# Patient Record
Sex: FEMALE | Race: WHITE | Employment: UNEMPLOYED | URBAN - METROPOLITAN AREA
[De-identification: names, ages, dates, MRNs, and addresses within clinical notes are randomized per-mention and may not be internally consistent; named-entity substitution may affect disease eponyms.]

---

## 2018-04-19 ENCOUNTER — OFFICE VISIT (OUTPATIENT)
Dept: FAMILY MEDICINE CLINIC | Age: 58
End: 2018-04-19
Payer: MEDICARE

## 2018-04-19 VITALS
HEART RATE: 63 BPM | BODY MASS INDEX: 28.7 KG/M2 | WEIGHT: 162 LBS | DIASTOLIC BLOOD PRESSURE: 78 MMHG | RESPIRATION RATE: 16 BRPM | TEMPERATURE: 98.3 F | SYSTOLIC BLOOD PRESSURE: 118 MMHG | HEIGHT: 63 IN | OXYGEN SATURATION: 93 %

## 2018-04-19 DIAGNOSIS — K29.70 GASTRITIS WITHOUT BLEEDING, UNSPECIFIED CHRONICITY, UNSPECIFIED GASTRITIS TYPE: ICD-10-CM

## 2018-04-19 DIAGNOSIS — I10 ESSENTIAL HYPERTENSION: Primary | ICD-10-CM

## 2018-04-19 DIAGNOSIS — K11.20 SIALADENITIS: ICD-10-CM

## 2018-04-19 DIAGNOSIS — R79.89 ABNORMAL CBC: ICD-10-CM

## 2018-04-19 DIAGNOSIS — E78.00 HYPERCHOLESTEROLEMIA: ICD-10-CM

## 2018-04-19 DIAGNOSIS — Z13.29 SCREENING FOR THYROID DISORDER: ICD-10-CM

## 2018-04-19 PROCEDURE — 4004F PT TOBACCO SCREEN RCVD TLK: CPT | Performed by: INTERNAL MEDICINE

## 2018-04-19 PROCEDURE — 3017F COLORECTAL CA SCREEN DOC REV: CPT | Performed by: INTERNAL MEDICINE

## 2018-04-19 PROCEDURE — G8419 CALC BMI OUT NRM PARAM NOF/U: HCPCS | Performed by: INTERNAL MEDICINE

## 2018-04-19 PROCEDURE — G8427 DOCREV CUR MEDS BY ELIG CLIN: HCPCS | Performed by: INTERNAL MEDICINE

## 2018-04-19 PROCEDURE — 99203 OFFICE O/P NEW LOW 30 MIN: CPT | Performed by: INTERNAL MEDICINE

## 2018-04-19 RX ORDER — AMOXICILLIN AND CLAVULANATE POTASSIUM 875; 125 MG/1; MG/1
1 TABLET, FILM COATED ORAL 2 TIMES DAILY
Qty: 14 TABLET | Refills: 0 | Status: SHIPPED | OUTPATIENT
Start: 2018-04-19 | End: 2018-04-26

## 2018-04-19 RX ORDER — DOXEPIN HYDROCHLORIDE 100 MG/1
100 CAPSULE ORAL NIGHTLY
COMMUNITY
Start: 2018-03-22 | End: 2018-05-22 | Stop reason: SDUPTHER

## 2018-04-19 RX ORDER — ATENOLOL 25 MG/1
25 TABLET ORAL DAILY
COMMUNITY
Start: 2018-04-18 | End: 2018-05-22 | Stop reason: SDUPTHER

## 2018-04-19 RX ORDER — TIZANIDINE 4 MG/1
4 TABLET ORAL NIGHTLY
COMMUNITY
End: 2022-05-16 | Stop reason: ALTCHOICE

## 2018-04-19 ASSESSMENT — PATIENT HEALTH QUESTIONNAIRE - PHQ9
1. LITTLE INTEREST OR PLEASURE IN DOING THINGS: 0
2. FEELING DOWN, DEPRESSED OR HOPELESS: 0
SUM OF ALL RESPONSES TO PHQ QUESTIONS 1-9: 0
SUM OF ALL RESPONSES TO PHQ9 QUESTIONS 1 & 2: 0

## 2018-04-25 ENCOUNTER — HOSPITAL ENCOUNTER (OUTPATIENT)
Age: 58
Setting detail: SPECIMEN
Discharge: HOME | End: 2018-04-25
Payer: MEDICARE

## 2018-04-25 DIAGNOSIS — I10 ESSENTIAL HYPERTENSION: ICD-10-CM

## 2018-04-25 DIAGNOSIS — E78.00 HYPERCHOLESTEROLEMIA: ICD-10-CM

## 2018-04-25 DIAGNOSIS — R79.89 ABNORMAL CBC: ICD-10-CM

## 2018-04-25 DIAGNOSIS — Z13.29 SCREENING FOR THYROID DISORDER: ICD-10-CM

## 2018-04-25 LAB
ABSOLUTE EOS #: 0.09 K/UL (ref 0–0.44)
ABSOLUTE IMMATURE GRANULOCYTE: 0.05 K/UL (ref 0–0.3)
ABSOLUTE LYMPH #: 1.99 K/UL (ref 1.1–3.7)
ABSOLUTE MONO #: 0.6 K/UL (ref 0.1–1.2)
ALBUMIN SERPL-MCNC: 4.3 G/DL (ref 3.5–5.2)
ALBUMIN/GLOBULIN RATIO: 1.9 (ref 1–2.5)
ALP BLD-CCNC: 119 U/L (ref 35–104)
ALT SERPL-CCNC: 33 U/L (ref 5–33)
ANION GAP SERPL CALCULATED.3IONS-SCNC: 14 MMOL/L (ref 9–17)
AST SERPL-CCNC: 16 U/L
BASOPHILS # BLD: 0 % (ref 0–2)
BASOPHILS ABSOLUTE: 0.04 K/UL (ref 0–0.2)
BILIRUB SERPL-MCNC: 0.3 MG/DL (ref 0.3–1.2)
BUN BLDV-MCNC: 9 MG/DL (ref 6–20)
BUN/CREAT BLD: ABNORMAL (ref 9–20)
CALCIUM SERPL-MCNC: 9 MG/DL (ref 8.6–10.4)
CHLORIDE BLD-SCNC: 103 MMOL/L (ref 98–107)
CHOLESTEROL, FASTING: 181 MG/DL
CHOLESTEROL/HDL RATIO: 3.5
CO2: 28 MMOL/L (ref 20–31)
CREAT SERPL-MCNC: 0.65 MG/DL (ref 0.5–0.9)
DIFFERENTIAL TYPE: ABNORMAL
EOSINOPHILS RELATIVE PERCENT: 1 % (ref 1–4)
GFR AFRICAN AMERICAN: >60 ML/MIN
GFR NON-AFRICAN AMERICAN: >60 ML/MIN
GFR SERPL CREATININE-BSD FRML MDRD: ABNORMAL ML/MIN/{1.73_M2}
GFR SERPL CREATININE-BSD FRML MDRD: ABNORMAL ML/MIN/{1.73_M2}
GLUCOSE BLD-MCNC: 100 MG/DL (ref 70–99)
HCT VFR BLD CALC: 46.9 % (ref 36.3–47.1)
HDLC SERPL-MCNC: 52 MG/DL
HEMOGLOBIN: 14.7 G/DL (ref 11.9–15.1)
IMMATURE GRANULOCYTES: 1 %
LDL CHOLESTEROL: 103 MG/DL (ref 0–130)
LYMPHOCYTES # BLD: 21 % (ref 24–43)
MCH RBC QN AUTO: 28.9 PG (ref 25.2–33.5)
MCHC RBC AUTO-ENTMCNC: 31.3 G/DL (ref 28.4–34.8)
MCV RBC AUTO: 92.1 FL (ref 82.6–102.9)
MONOCYTES # BLD: 6 % (ref 3–12)
NRBC AUTOMATED: 0 PER 100 WBC
PDW BLD-RTO: 13.2 % (ref 11.8–14.4)
PLATELET # BLD: 210 K/UL (ref 138–453)
PLATELET ESTIMATE: ABNORMAL
PMV BLD AUTO: 11.5 FL (ref 8.1–13.5)
POTASSIUM SERPL-SCNC: 4.2 MMOL/L (ref 3.7–5.3)
RBC # BLD: 5.09 M/UL (ref 3.95–5.11)
RBC # BLD: ABNORMAL 10*6/UL
SEG NEUTROPHILS: 71 % (ref 36–65)
SEGMENTED NEUTROPHILS ABSOLUTE COUNT: 6.69 K/UL (ref 1.5–8.1)
SODIUM BLD-SCNC: 145 MMOL/L (ref 135–144)
TOTAL PROTEIN: 6.6 G/DL (ref 6.4–8.3)
TRIGLYCERIDE, FASTING: 128 MG/DL
TSH SERPL DL<=0.05 MIU/L-ACNC: 4.93 MIU/L (ref 0.3–5)
VLDLC SERPL CALC-MCNC: NORMAL MG/DL (ref 1–30)
WBC # BLD: 9.5 K/UL (ref 3.5–11.3)
WBC # BLD: ABNORMAL 10*3/UL

## 2018-05-22 ENCOUNTER — OFFICE VISIT (OUTPATIENT)
Dept: FAMILY MEDICINE CLINIC | Age: 58
End: 2018-05-22
Payer: MEDICARE

## 2018-05-22 VITALS
SYSTOLIC BLOOD PRESSURE: 138 MMHG | HEIGHT: 63 IN | DIASTOLIC BLOOD PRESSURE: 94 MMHG | TEMPERATURE: 97 F | BODY MASS INDEX: 28.53 KG/M2 | WEIGHT: 161 LBS | HEART RATE: 73 BPM | OXYGEN SATURATION: 93 %

## 2018-05-22 DIAGNOSIS — F41.1 GAD (GENERALIZED ANXIETY DISORDER): ICD-10-CM

## 2018-05-22 DIAGNOSIS — G47.9 SLEEPING DIFFICULTY: ICD-10-CM

## 2018-05-22 DIAGNOSIS — M26.629 TEMPOROMANDIBULAR JOINT-PAIN-DYSFUNCTION SYNDROME (TMJ): ICD-10-CM

## 2018-05-22 DIAGNOSIS — I10 ESSENTIAL HYPERTENSION: Primary | ICD-10-CM

## 2018-05-22 DIAGNOSIS — F33.41 RECURRENT MAJOR DEPRESSIVE DISORDER, IN PARTIAL REMISSION (HCC): ICD-10-CM

## 2018-05-22 DIAGNOSIS — E78.00 HYPERCHOLESTEROLEMIA: ICD-10-CM

## 2018-05-22 DIAGNOSIS — Z72.0 TOBACCO ABUSE: ICD-10-CM

## 2018-05-22 DIAGNOSIS — Q79.60 EHLERS-DANLOS SYNDROME: ICD-10-CM

## 2018-05-22 PROCEDURE — 3017F COLORECTAL CA SCREEN DOC REV: CPT | Performed by: INTERNAL MEDICINE

## 2018-05-22 PROCEDURE — 99214 OFFICE O/P EST MOD 30 MIN: CPT | Performed by: INTERNAL MEDICINE

## 2018-05-22 PROCEDURE — G8419 CALC BMI OUT NRM PARAM NOF/U: HCPCS | Performed by: INTERNAL MEDICINE

## 2018-05-22 PROCEDURE — G8427 DOCREV CUR MEDS BY ELIG CLIN: HCPCS | Performed by: INTERNAL MEDICINE

## 2018-05-22 PROCEDURE — 4004F PT TOBACCO SCREEN RCVD TLK: CPT | Performed by: INTERNAL MEDICINE

## 2018-05-22 RX ORDER — ATENOLOL 25 MG/1
25 TABLET ORAL DAILY
Qty: 90 TABLET | Refills: 1 | Status: SHIPPED | OUTPATIENT
Start: 2018-05-22 | End: 2018-11-12 | Stop reason: SDUPTHER

## 2018-05-22 RX ORDER — SIMVASTATIN 40 MG
40 TABLET ORAL DAILY
Qty: 90 TABLET | Refills: 1 | Status: SHIPPED | OUTPATIENT
Start: 2018-05-22 | End: 2018-11-12 | Stop reason: SDUPTHER

## 2018-05-22 RX ORDER — DOXEPIN HYDROCHLORIDE 100 MG/1
100 CAPSULE ORAL NIGHTLY
Qty: 90 CAPSULE | Refills: 1 | Status: SHIPPED | OUTPATIENT
Start: 2018-05-22 | End: 2018-11-12 | Stop reason: SDUPTHER

## 2018-05-22 RX ORDER — OMEPRAZOLE 20 MG/1
20 CAPSULE, DELAYED RELEASE ORAL DAILY
COMMUNITY
End: 2018-11-16 | Stop reason: SDUPTHER

## 2018-06-05 DIAGNOSIS — Z12.31 ENCOUNTER FOR SCREENING MAMMOGRAM FOR BREAST CANCER: Primary | ICD-10-CM

## 2018-08-13 NOTE — TELEPHONE ENCOUNTER
Pt LM on nurses line asking for refill. Last filled by previous PCP.   Pt states she will be out on Thursday 8/16/18    Next Visit Date:  Future Appointments  Date Time Provider Willis Barrigai   8/31/2018 1:15 PM Tiffanie Sy  [address] [facility]   Topic Date Due    Colon cancer screen colonoscopy  03/24/2010    Low dose CT lung screening  03/24/2015    DTaP/Tdap/Td vaccine (1 - Tdap) 04/19/2019 (Originally 3/24/1979)    Breast cancer screen  04/19/2019 (Originally 3/24/2010)    Cervical cancer screen  04/19/2019 (Originally 3/24/1981)    Pneumococcal med risk (1 of 1 - PPSV23) 04/19/2019 (Originally 3/24/1979)    Shingles Vaccine (1 of 2 - 2 Dose Series) 04/19/2019 (Originally 3/24/2010)    Hepatitis C screen  04/19/2019 (Originally 1960)    HIV screen  04/19/2019 (Originally 3/24/1975)    Flu vaccine (1) 09/01/2018    Potassium monitoring  04/25/2019    Creatinine monitoring  04/25/2019    Lipid screen  04/25/2023       No results found for: LABA1C          ( goal A1C is < 7)   No results found for: LABMICR  LDL Cholesterol (mg/dL)   Date Value   04/25/2018 103   09/18/2014 191 (H)       (goal LDL is <100)   AST (U/L)   Date Value   04/25/2018 16     ALT (U/L)   Date Value   04/25/2018 33     BUN (mg/dL)   Date Value   04/25/2018 9     BP Readings from Last 3 Encounters:   05/22/18 (!) 138/94   04/19/18 118/78   02/23/15 133/73          (goal 120/80)    All Future Testing planned in [facility]  Lab Frequency Next Occurrence   JUAN JOSÉ DIGITAL DIAGNOSTIC W OR WO CAD BILATERAL Once 12/05/2018               Patient Active Problem List:     Recurrent major depressive disorder, in partial remission ([facility] [address]     Essential hypertension     Hypercholesterolemia     ELY (generalized anxiety disorder)     Jordan-Danlos syndrome     Temporomandibular joint-pain-dysfunction syndrome (TMJ)

## 2018-08-14 RX ORDER — HYDROXYZINE PAMOATE 50 MG/1
50 CAPSULE ORAL 2 TIMES DAILY
Qty: 60 CAPSULE | Refills: 1 | Status: SHIPPED | OUTPATIENT
Start: 2018-08-14 | End: 2018-10-12 | Stop reason: SDUPTHER

## 2018-08-31 ENCOUNTER — OFFICE VISIT (OUTPATIENT)
Dept: FAMILY MEDICINE CLINIC | Age: 58
End: 2018-08-31
Payer: MEDICARE

## 2018-08-31 VITALS
SYSTOLIC BLOOD PRESSURE: 130 MMHG | TEMPERATURE: 98.8 F | BODY MASS INDEX: 28.76 KG/M2 | WEIGHT: 159.8 LBS | RESPIRATION RATE: 16 BRPM | HEART RATE: 56 BPM | OXYGEN SATURATION: 95 % | DIASTOLIC BLOOD PRESSURE: 72 MMHG

## 2018-08-31 DIAGNOSIS — E78.00 HYPERCHOLESTEROLEMIA: ICD-10-CM

## 2018-08-31 DIAGNOSIS — F41.1 GAD (GENERALIZED ANXIETY DISORDER): ICD-10-CM

## 2018-08-31 DIAGNOSIS — M15.9 GENERALIZED OSTEOARTHRITIS: ICD-10-CM

## 2018-08-31 DIAGNOSIS — F33.41 RECURRENT MAJOR DEPRESSIVE DISORDER, IN PARTIAL REMISSION (HCC): Primary | ICD-10-CM

## 2018-08-31 DIAGNOSIS — I10 ESSENTIAL HYPERTENSION: ICD-10-CM

## 2018-08-31 DIAGNOSIS — G89.4 CHRONIC PAIN SYNDROME: ICD-10-CM

## 2018-08-31 PROCEDURE — 99214 OFFICE O/P EST MOD 30 MIN: CPT | Performed by: INTERNAL MEDICINE

## 2018-08-31 PROCEDURE — G8419 CALC BMI OUT NRM PARAM NOF/U: HCPCS | Performed by: INTERNAL MEDICINE

## 2018-08-31 PROCEDURE — 3017F COLORECTAL CA SCREEN DOC REV: CPT | Performed by: INTERNAL MEDICINE

## 2018-08-31 PROCEDURE — G8427 DOCREV CUR MEDS BY ELIG CLIN: HCPCS | Performed by: INTERNAL MEDICINE

## 2018-08-31 PROCEDURE — 4004F PT TOBACCO SCREEN RCVD TLK: CPT | Performed by: INTERNAL MEDICINE

## 2018-10-08 NOTE — TELEPHONE ENCOUNTER
Noted as historic med. Pt finished rx from previous PCP today.     Seen 8/31/18  Next Visit Date:  Future Appointments  Date Time Provider Willis Yadira   3/5/2019 1:00 PM Tiffanie Randle  [address] [facility]   Topic Date Due    Colon cancer screen colonoscopy  03/24/2010    Low dose CT lung screening  03/24/2015    Flu vaccine (1) 09/01/2018    DTaP/Tdap/Td vaccine (1 - Tdap) 04/19/2019 (Originally 3/24/1979)    Breast cancer screen  04/19/2019 (Originally 3/24/2010)    Cervical cancer screen  04/19/2019 (Originally 3/24/1981)    Pneumococcal med risk (1 of 1 - PPSV23) 04/19/2019 (Originally 3/24/1979)    Shingles Vaccine (1 of 2 - 2 Dose Series) 04/19/2019 (Originally 3/24/2010)    Hepatitis C screen  04/19/2019 (Originally 1960)    HIV screen  04/19/2019 (Originally 3/24/1975)    Potassium monitoring  04/25/2019    Creatinine monitoring  04/25/2019    Lipid screen  04/25/2023       No results found for: LABA1C          ( goal A1C is < 7)   No results found for: LABMICR  LDL Cholesterol (mg/dL)   Date Value   04/25/2018 103   09/18/2014 191 (H)       (goal LDL is <100)   AST (U/L)   Date Value   04/25/2018 16     ALT (U/L)   Date Value   04/25/2018 33     BUN (mg/dL)   Date Value   04/25/2018 9     BP Readings from Last 3 Encounters:   08/31/18 130/72   05/22/18 (!) 138/94   04/19/18 118/78          (goal 120/80)    All Future Testing planned in [facility]  Lab Frequency Next Occurrence   JUAN JOSÉ DIGITAL DIAGNOSTIC W OR WO CAD BILATERAL Once 12/05/2018               Patient Active Problem List:     Recurrent major depressive disorder, in partial remission ([facility] [address]     Essential hypertension     Hypercholesterolemia     ELY (generalized anxiety disorder)     Jordan-Danlos syndrome     Temporomandibular joint-pain-dysfunction syndrome (TMJ)

## 2018-10-09 RX ORDER — DULOXETIN HYDROCHLORIDE 60 MG/1
60 CAPSULE, DELAYED RELEASE ORAL 2 TIMES DAILY
Qty: 180 CAPSULE | Refills: 1 | Status: SHIPPED | OUTPATIENT
Start: 2018-10-09 | End: 2020-02-10

## 2018-10-12 RX ORDER — HYDROXYZINE PAMOATE 50 MG/1
CAPSULE ORAL
Qty: 60 CAPSULE | Refills: 1 | Status: SHIPPED | OUTPATIENT
Start: 2018-10-12 | End: 2018-12-10 | Stop reason: SDUPTHER

## 2018-11-12 DIAGNOSIS — E78.00 HYPERCHOLESTEROLEMIA: ICD-10-CM

## 2018-11-12 DIAGNOSIS — G47.9 SLEEPING DIFFICULTY: ICD-10-CM

## 2018-11-12 DIAGNOSIS — I10 ESSENTIAL HYPERTENSION: ICD-10-CM

## 2018-11-12 RX ORDER — DOXEPIN HYDROCHLORIDE 100 MG/1
CAPSULE ORAL
Qty: 90 CAPSULE | Refills: 1 | Status: SHIPPED | OUTPATIENT
Start: 2018-11-12 | End: 2019-05-06 | Stop reason: SDUPTHER

## 2018-11-12 RX ORDER — PSYLLIUM HUSK 3.4 G/7G
POWDER ORAL
Qty: 180 TABLET | Refills: 1 | Status: SHIPPED | OUTPATIENT
Start: 2018-11-12 | End: 2019-05-06 | Stop reason: SDUPTHER

## 2018-11-12 RX ORDER — SIMVASTATIN 40 MG
TABLET ORAL
Qty: 90 TABLET | Refills: 1 | Status: SHIPPED | OUTPATIENT
Start: 2018-11-12 | End: 2019-05-06 | Stop reason: SDUPTHER

## 2018-11-12 RX ORDER — ATENOLOL 25 MG/1
TABLET ORAL
Qty: 90 TABLET | Refills: 1 | Status: SHIPPED | OUTPATIENT
Start: 2018-11-12 | End: 2019-05-06 | Stop reason: SDUPTHER

## 2018-11-16 RX ORDER — OMEPRAZOLE 20 MG/1
20 CAPSULE, DELAYED RELEASE ORAL DAILY
Qty: 30 CAPSULE | Refills: 5 | Status: SHIPPED | OUTPATIENT
Start: 2018-11-16 | End: 2020-10-07 | Stop reason: ALTCHOICE

## 2018-11-16 NOTE — TELEPHONE ENCOUNTER
Last visit 8/31/18  Next Visit Date:  Future Appointments  Date Time Provider Willis May   3/5/2019 1:00 PM Tiffanie Kenyon  [address] [facility]   Topic Date Due    Colon cancer screen colonoscopy  03/24/2010    Low dose CT lung screening  03/24/2015    Flu vaccine (1) 09/01/2018    DTaP/Tdap/Td vaccine (1 - Tdap) 04/19/2019 (Originally 3/24/1979)    Breast cancer screen  04/19/2019 (Originally 3/24/2010)    Cervical cancer screen  04/19/2019 (Originally 3/24/1981)    Pneumococcal med risk (1 of 1 - PPSV23) 04/19/2019 (Originally 3/24/1979)    Shingles Vaccine (1 of 2 - 2 Dose Series) 04/19/2019 (Originally 3/24/2010)    Hepatitis C screen  04/19/2019 (Originally 1960)    HIV screen  04/19/2019 (Originally 3/24/1975)    Potassium monitoring  04/25/2019    Creatinine monitoring  04/25/2019    Lipid screen  04/25/2023       No results found for: LABA1C          ( goal A1C is < 7)   No results found for: LABMICR  LDL Cholesterol (mg/dL)   Date Value   04/25/2018 103   09/18/2014 191 (H)       (goal LDL is <100)   AST (U/L)   Date Value   04/25/2018 16     ALT (U/L)   Date Value   04/25/2018 33     BUN (mg/dL)   Date Value   04/25/2018 9     BP Readings from Last 3 Encounters:   08/31/18 130/72   05/22/18 (!) 138/94   04/19/18 118/78          (goal 120/80)    All Future Testing planned in [facility]  Lab Frequency Next Occurrence   JUAN JOSÉ DIGITAL DIAGNOSTIC W OR WO CAD BILATERAL Once 12/05/2018               Patient Active Problem List:     Recurrent major depressive disorder, in partial remission ([facility] [address]     Essential hypertension     Hypercholesterolemia     ELY (generalized anxiety disorder)     Jordan-Danlos syndrome     Temporomandibular joint-pain-dysfunction syndrome (TMJ)

## 2018-12-11 RX ORDER — HYDROXYZINE PAMOATE 50 MG/1
CAPSULE ORAL
Qty: 180 CAPSULE | Refills: 1 | Status: SHIPPED | OUTPATIENT
Start: 2018-12-11 | End: 2019-06-05 | Stop reason: SDUPTHER

## 2019-03-05 ENCOUNTER — OFFICE VISIT (OUTPATIENT)
Dept: FAMILY MEDICINE CLINIC | Age: 59
End: 2019-03-05
Payer: MEDICARE

## 2019-03-05 VITALS
DIASTOLIC BLOOD PRESSURE: 70 MMHG | BODY MASS INDEX: 29.34 KG/M2 | HEART RATE: 62 BPM | WEIGHT: 163 LBS | OXYGEN SATURATION: 96 % | SYSTOLIC BLOOD PRESSURE: 126 MMHG | RESPIRATION RATE: 16 BRPM | TEMPERATURE: 98.5 F

## 2019-03-05 DIAGNOSIS — R52 CHRONIC GENERALIZED PAIN: ICD-10-CM

## 2019-03-05 DIAGNOSIS — F33.41 RECURRENT MAJOR DEPRESSIVE DISORDER, IN PARTIAL REMISSION (HCC): ICD-10-CM

## 2019-03-05 DIAGNOSIS — E78.00 HYPERCHOLESTEROLEMIA: Primary | ICD-10-CM

## 2019-03-05 DIAGNOSIS — I10 ESSENTIAL HYPERTENSION: ICD-10-CM

## 2019-03-05 DIAGNOSIS — M53.3 SACROILIAC PAIN: ICD-10-CM

## 2019-03-05 DIAGNOSIS — Z72.0 CONTINUOUS TOBACCO ABUSE: ICD-10-CM

## 2019-03-05 DIAGNOSIS — Q79.60 EHLERS-DANLOS SYNDROME: ICD-10-CM

## 2019-03-05 DIAGNOSIS — G89.29 CHRONIC GENERALIZED PAIN: ICD-10-CM

## 2019-03-05 DIAGNOSIS — F41.1 GAD (GENERALIZED ANXIETY DISORDER): ICD-10-CM

## 2019-03-05 DIAGNOSIS — M26.629 TEMPOROMANDIBULAR JOINT-PAIN-DYSFUNCTION SYNDROME (TMJ): ICD-10-CM

## 2019-03-05 PROCEDURE — G8484 FLU IMMUNIZE NO ADMIN: HCPCS | Performed by: INTERNAL MEDICINE

## 2019-03-05 PROCEDURE — 3017F COLORECTAL CA SCREEN DOC REV: CPT | Performed by: INTERNAL MEDICINE

## 2019-03-05 PROCEDURE — 99214 OFFICE O/P EST MOD 30 MIN: CPT | Performed by: INTERNAL MEDICINE

## 2019-03-05 PROCEDURE — G8427 DOCREV CUR MEDS BY ELIG CLIN: HCPCS | Performed by: INTERNAL MEDICINE

## 2019-03-05 PROCEDURE — G8419 CALC BMI OUT NRM PARAM NOF/U: HCPCS | Performed by: INTERNAL MEDICINE

## 2019-03-05 PROCEDURE — 4004F PT TOBACCO SCREEN RCVD TLK: CPT | Performed by: INTERNAL MEDICINE

## 2019-03-05 RX ORDER — NAPROXEN 500 MG/1
500 TABLET ORAL 2 TIMES DAILY WITH MEALS
Qty: 60 TABLET | Refills: 5 | Status: SHIPPED | OUTPATIENT
Start: 2019-03-05 | End: 2020-03-06

## 2019-03-05 RX ORDER — METHYLPREDNISOLONE 4 MG/1
TABLET ORAL
Qty: 1 KIT | Refills: 0 | Status: SHIPPED | OUTPATIENT
Start: 2019-03-05 | End: 2019-03-11

## 2019-04-30 ENCOUNTER — OFFICE VISIT (OUTPATIENT)
Dept: FAMILY MEDICINE CLINIC | Age: 59
End: 2019-04-30
Payer: MEDICARE

## 2019-04-30 ENCOUNTER — TELEPHONE (OUTPATIENT)
Dept: FAMILY MEDICINE CLINIC | Age: 59
End: 2019-04-30

## 2019-04-30 VITALS
RESPIRATION RATE: 16 BRPM | HEART RATE: 57 BPM | OXYGEN SATURATION: 95 % | WEIGHT: 170 LBS | TEMPERATURE: 97.5 F | SYSTOLIC BLOOD PRESSURE: 110 MMHG | BODY MASS INDEX: 30.6 KG/M2 | DIASTOLIC BLOOD PRESSURE: 74 MMHG

## 2019-04-30 DIAGNOSIS — H34.8312 BRANCH RETINAL VEIN OCCLUSION OF RIGHT EYE, UNSPECIFIED COMPLICATION STATUS: Primary | ICD-10-CM

## 2019-04-30 PROCEDURE — G8427 DOCREV CUR MEDS BY ELIG CLIN: HCPCS | Performed by: INTERNAL MEDICINE

## 2019-04-30 PROCEDURE — G8417 CALC BMI ABV UP PARAM F/U: HCPCS | Performed by: INTERNAL MEDICINE

## 2019-04-30 PROCEDURE — 99214 OFFICE O/P EST MOD 30 MIN: CPT | Performed by: INTERNAL MEDICINE

## 2019-04-30 PROCEDURE — 3017F COLORECTAL CA SCREEN DOC REV: CPT | Performed by: INTERNAL MEDICINE

## 2019-04-30 PROCEDURE — 4004F PT TOBACCO SCREEN RCVD TLK: CPT | Performed by: INTERNAL MEDICINE

## 2019-04-30 NOTE — PROGRESS NOTES
Patient is present because her doctor suggested the appt. Dr. Nkechi Ritter has office notes. No other concerns at this time. Visit Information    Have you changed or started any medications since your last visit including any over-the-counter medicines, vitamins, or herbal medicines? no   Have you stopped taking any of your medications? Is so, why? -  no  Are you having any side effects from any of your medications? - no    Have you seen any other physician or provider since your last visit? yes - eye   Have you had any other diagnostic tests since your last visit?  no   Have you been seen in the emergency room and/or had an admission in a hospital since we last saw you?  no   Have you had your routine dental cleaning in the past 6 months?  no     Do you have an active MyChart account? If no, what is the barrier?   No: declined     Patient Care Team:  Fay Mack MD as PCP - General (Internal Medicine)  Fay Mack MD as PCP - S Attributed Provider  Manuel Bueno MD (Pain Management)  Mouna Joy MD as Orthopedic Surgeon (Orthopedic Surgery)  Matthew Kasper MD (Psychiatry)  Juanito Barney MD as Surgeon (Orthopedic Surgery)    Medical History Review  Past Medical, Family, and Social History reviewed and does not contribute to the patient presenting condition    Health Maintenance   Topic Date Due    Hepatitis C screen  1960    Pneumococcal 0-64 years Vaccine (1 of 1 - PPSV23) 03/24/1966    HIV screen  03/24/1975    DTaP/Tdap/Td vaccine (1 - Tdap) 03/24/1979    Cervical cancer screen  03/24/1981    Breast cancer screen  03/24/2010    Shingles Vaccine (1 of 2) 03/24/2010    Colon cancer screen colonoscopy  03/24/2010    Low dose CT lung screening  03/24/2015    Potassium monitoring  04/25/2019    Creatinine monitoring  04/25/2019    Flu vaccine (Season Ended) 09/01/2019    Lipid screen  04/25/2023
MD on 4/30/2019 at 5:30 PM

## 2019-05-01 ENCOUNTER — HOSPITAL ENCOUNTER (OUTPATIENT)
Age: 59
Setting detail: SPECIMEN
Discharge: HOME | End: 2019-05-01
Payer: MEDICARE

## 2019-05-01 DIAGNOSIS — I10 ESSENTIAL HYPERTENSION: ICD-10-CM

## 2019-05-01 DIAGNOSIS — E78.00 HYPERCHOLESTEROLEMIA: ICD-10-CM

## 2019-05-01 DIAGNOSIS — H34.8312 BRANCH RETINAL VEIN OCCLUSION OF RIGHT EYE, UNSPECIFIED COMPLICATION STATUS: ICD-10-CM

## 2019-05-01 LAB
ABSOLUTE EOS #: 0.08 K/UL (ref 0–0.44)
ABSOLUTE IMMATURE GRANULOCYTE: <0.03 K/UL (ref 0–0.3)
ABSOLUTE LYMPH #: 1.45 K/UL (ref 1.1–3.7)
ABSOLUTE MONO #: 0.52 K/UL (ref 0.1–1.2)
ALBUMIN SERPL-MCNC: 3.9 G/DL (ref 3.5–5.2)
ALBUMIN/GLOBULIN RATIO: 1.9 (ref 1–2.5)
ALP BLD-CCNC: 110 U/L (ref 35–104)
ALT SERPL-CCNC: 16 U/L (ref 5–33)
ANION GAP SERPL CALCULATED.3IONS-SCNC: 12 MMOL/L (ref 9–17)
AST SERPL-CCNC: 15 U/L
BASOPHILS # BLD: 1 % (ref 0–2)
BASOPHILS ABSOLUTE: 0.03 K/UL (ref 0–0.2)
BILIRUB SERPL-MCNC: 0.39 MG/DL (ref 0.3–1.2)
BUN BLDV-MCNC: 7 MG/DL (ref 6–20)
BUN/CREAT BLD: ABNORMAL (ref 9–20)
C-REACTIVE PROTEIN: 0.8 MG/L (ref 0–5)
CALCIUM SERPL-MCNC: 9.3 MG/DL (ref 8.6–10.4)
CHLORIDE BLD-SCNC: 105 MMOL/L (ref 98–107)
CHOLESTEROL, FASTING: 163 MG/DL
CHOLESTEROL/HDL RATIO: 3.8
CO2: 27 MMOL/L (ref 20–31)
CREAT SERPL-MCNC: 0.68 MG/DL (ref 0.5–0.9)
DIFFERENTIAL TYPE: NORMAL
EOSINOPHILS RELATIVE PERCENT: 2 % (ref 1–4)
ESTIMATED AVERAGE GLUCOSE: 117 MG/DL
GFR AFRICAN AMERICAN: >60 ML/MIN
GFR NON-AFRICAN AMERICAN: >60 ML/MIN
GFR SERPL CREATININE-BSD FRML MDRD: ABNORMAL ML/MIN/{1.73_M2}
GFR SERPL CREATININE-BSD FRML MDRD: ABNORMAL ML/MIN/{1.73_M2}
GLUCOSE BLD-MCNC: 103 MG/DL (ref 70–99)
HBA1C MFR BLD: 5.7 % (ref 4–6)
HCT VFR BLD CALC: 45.8 % (ref 36.3–47.1)
HDLC SERPL-MCNC: 43 MG/DL
HEMOGLOBIN: 14.3 G/DL (ref 11.9–15.1)
HOMOCYSTEINE: 8.4 UMOL/L
IMMATURE GRANULOCYTES: 0 %
LDL CHOLESTEROL: 93 MG/DL (ref 0–130)
LYMPHOCYTES # BLD: 27 % (ref 24–43)
MCH RBC QN AUTO: 29.3 PG (ref 25.2–33.5)
MCHC RBC AUTO-ENTMCNC: 31.2 G/DL (ref 28.4–34.8)
MCV RBC AUTO: 93.9 FL (ref 82.6–102.9)
MONOCYTES # BLD: 10 % (ref 3–12)
NRBC AUTOMATED: 0 PER 100 WBC
PDW BLD-RTO: 13 % (ref 11.8–14.4)
PLATELET # BLD: 203 K/UL (ref 138–453)
PLATELET ESTIMATE: NORMAL
PMV BLD AUTO: 10.9 FL (ref 8.1–13.5)
POTASSIUM SERPL-SCNC: 5.1 MMOL/L (ref 3.7–5.3)
RBC # BLD: 4.88 M/UL (ref 3.95–5.11)
RBC # BLD: NORMAL 10*6/UL
SEDIMENTATION RATE, ERYTHROCYTE: 4 MM (ref 0–20)
SEG NEUTROPHILS: 60 % (ref 36–65)
SEGMENTED NEUTROPHILS ABSOLUTE COUNT: 3.35 K/UL (ref 1.5–8.1)
SODIUM BLD-SCNC: 144 MMOL/L (ref 135–144)
TOTAL PROTEIN: 6 G/DL (ref 6.4–8.3)
TRIGLYCERIDE, FASTING: 134 MG/DL
VLDLC SERPL CALC-MCNC: NORMAL MG/DL (ref 1–30)
WBC # BLD: 5.4 K/UL (ref 3.5–11.3)
WBC # BLD: NORMAL 10*3/UL

## 2019-05-02 LAB
ANTICARDIOLIPIN IGA ANTIBODY: 2.2 APU
ANTICARDIOLIPIN IGG ANTIBODY: 1.6 GPU
CARDIOLIPIN AB IGM: 3.1 MPU

## 2019-05-06 DIAGNOSIS — E78.00 HYPERCHOLESTEROLEMIA: ICD-10-CM

## 2019-05-06 DIAGNOSIS — G47.9 SLEEPING DIFFICULTY: ICD-10-CM

## 2019-05-06 DIAGNOSIS — I10 ESSENTIAL HYPERTENSION: ICD-10-CM

## 2019-05-06 NOTE — TELEPHONE ENCOUNTER
Last visit: 4/30/19  Last Med refill: 11/12/18  Does patient have enough medication for 72 hours: Yes    Next Visit Date:  Future Appointments   Date Time Provider Willis May   5/14/2019  2:45 PM Tiffanie Rodriguez MD [id number]   10/2/2019  1:00 PM Tiffanie Rodriguez  [address] [facility]   Topic Date Due    Hepatitis C screen  1960    Pneumococcal 0-64 years Vaccine (1 of 1 - PPSV23) 03/24/1966    HIV screen  03/24/1975    DTaP/Tdap/Td vaccine (1 - Tdap) 03/24/1979    Cervical cancer screen  03/24/1981    Breast cancer screen  03/24/2010    Shingles Vaccine (1 of 2) 03/24/2010    Colon cancer screen colonoscopy  03/24/2010    Low dose CT lung screening  03/24/2015    Flu vaccine (Season Ended) 09/01/2019    A1C test (Diabetic or Prediabetic)  05/01/2020    Potassium monitoring  05/01/2020    Creatinine monitoring  05/01/2020    Lipid screen  05/01/2024       Hemoglobin A1C (%)   Date Value   05/01/2019 5.7             ( goal A1C is < 7)   No results found for: LABMICR  LDL Cholesterol (mg/dL)   Date Value   05/01/2019 93   04/25/2018 103       (goal LDL is <100)   AST (U/L)   Date Value   05/01/2019 15     ALT (U/L)   Date Value   05/01/2019 16     BUN (mg/dL)   Date Value   05/01/2019 7     BP Readings from Last 3 Encounters:   04/30/19 110/74   03/05/19 126/70   08/31/18 130/72          (goal 120/80)    All Future Testing planned in [facility]              Patient Active Problem List:     Recurrent major depressive disorder, in partial remission (HCC)     Essential hypertension     Hypercholesterolemia     ELY (generalized anxiety disorder)     Jordan-Danlos syndrome     Temporomandibular joint-pain-dysfunction syndrome (TMJ)     Sacroiliac pain     Chronic generalized pain

## 2019-05-08 RX ORDER — SIMVASTATIN 40 MG
TABLET ORAL
Qty: 90 TABLET | Refills: 1 | Status: SHIPPED | OUTPATIENT
Start: 2019-05-08 | End: 2019-11-14 | Stop reason: SDUPTHER

## 2019-05-08 RX ORDER — DOXEPIN HYDROCHLORIDE 100 MG/1
CAPSULE ORAL
Qty: 90 CAPSULE | Refills: 1 | Status: SHIPPED | OUTPATIENT
Start: 2019-05-08 | End: 2019-10-02 | Stop reason: CLARIF

## 2019-05-08 RX ORDER — ATENOLOL 25 MG/1
TABLET ORAL
Qty: 90 TABLET | Refills: 1 | Status: SHIPPED | OUTPATIENT
Start: 2019-05-08 | End: 2019-11-14 | Stop reason: SDUPTHER

## 2019-05-08 RX ORDER — PSYLLIUM HUSK 3.4 G/7G
POWDER ORAL
Qty: 180 TABLET | Refills: 1 | Status: SHIPPED | OUTPATIENT
Start: 2019-05-08 | End: 2019-11-14 | Stop reason: SDUPTHER

## 2019-05-09 DIAGNOSIS — H34.8312 BRANCH RETINAL VEIN OCCLUSION OF RIGHT EYE, UNSPECIFIED COMPLICATION STATUS: Primary | ICD-10-CM

## 2019-05-09 LAB — FACTOR V LEIDEN MUTATION: NORMAL

## 2019-05-14 ENCOUNTER — HOSPITAL ENCOUNTER (OUTPATIENT)
Age: 59
Setting detail: SPECIMEN
Discharge: HOME | End: 2019-05-14
Payer: MEDICARE

## 2019-05-14 ENCOUNTER — OFFICE VISIT (OUTPATIENT)
Dept: FAMILY MEDICINE CLINIC | Age: 59
End: 2019-05-14
Payer: MEDICARE

## 2019-05-14 VITALS
OXYGEN SATURATION: 96 % | DIASTOLIC BLOOD PRESSURE: 74 MMHG | TEMPERATURE: 97.8 F | WEIGHT: 170 LBS | SYSTOLIC BLOOD PRESSURE: 112 MMHG | BODY MASS INDEX: 30.6 KG/M2 | RESPIRATION RATE: 16 BRPM | HEART RATE: 68 BPM

## 2019-05-14 DIAGNOSIS — H34.8312 BRANCH RETINAL VEIN OCCLUSION OF RIGHT EYE, UNSPECIFIED COMPLICATION STATUS: ICD-10-CM

## 2019-05-14 DIAGNOSIS — Z72.0 TOBACCO ABUSE: ICD-10-CM

## 2019-05-14 DIAGNOSIS — H34.8312 BRANCH RETINAL VEIN OCCLUSION OF RIGHT EYE, UNSPECIFIED COMPLICATION STATUS: Primary | ICD-10-CM

## 2019-05-14 PROCEDURE — G8417 CALC BMI ABV UP PARAM F/U: HCPCS | Performed by: INTERNAL MEDICINE

## 2019-05-14 PROCEDURE — 99213 OFFICE O/P EST LOW 20 MIN: CPT | Performed by: INTERNAL MEDICINE

## 2019-05-14 PROCEDURE — 4004F PT TOBACCO SCREEN RCVD TLK: CPT | Performed by: INTERNAL MEDICINE

## 2019-05-14 PROCEDURE — G8428 CUR MEDS NOT DOCUMENT: HCPCS | Performed by: INTERNAL MEDICINE

## 2019-05-14 PROCEDURE — 3017F COLORECTAL CA SCREEN DOC REV: CPT | Performed by: INTERNAL MEDICINE

## 2019-05-14 NOTE — PROGRESS NOTES
Subjective:       Patient ID: Eb Ibanez is a 61 y.o. female who presents for   Chief Complaint   Patient presents with    Eye Problem     right, 2 week follow up        HPI:  Nursing note reviewed and discussed with patient. 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 Compliant with BP and HLD meds, no missed doses   She has cut back to 9 cig/day from 14 cig/day as far as her smoking and plans to continue quitting gradually. --> unchanged   Has been off naproxen now for a couple of weeks, back pain is really bad, ready to go back on it    Patient's medications, allergies, past medical, surgical, social and family histories were reviewed and updated as appropriate. Social History     Tobacco Use    Smoking status: Current Every Day Smoker     Packs/day: 0.75     Years: 41.00     Pack years: 30.75    Smokeless tobacco: Never Used   Substance Use Topics    Alcohol use: No        Review of Systems  Energy level good overall, and weight is stable. No chest pain or shortness of breath. Bowels have been normal without constipation or diarrhea         Objective:        Physical Exam:  /74 (Site: Right Upper Arm, Position: Sitting, Cuff Size: Medium Adult)   Pulse 68   Temp 97.8 °F (36.6 °C) (Oral)   Resp 16   Wt 170 lb (77.1 kg)   SpO2 96%   BMI 30.60 kg/m²     General: Alert and oriented, anxious, wearing dark glasses due to pupil dilation. Patient ambulating with normal gait. Chest: clear with no wheezes or rales. No retractions, or use of accessory muscles noted. Cardiovascular: PMI is not displaced, and no thrill noted. Regular rate and rhythm with no rub, murmur or gallop. There is no peripheral edema.   Pedal pulses are
05/01/2020    Lipid screen  05/01/2024

## 2019-05-16 LAB
MTHFR MUTATION 677T/A1298C: NORMAL
PROTHROMBIN G20210A MUTATION: NORMAL

## 2019-06-05 ENCOUNTER — TELEPHONE (OUTPATIENT)
Dept: FAMILY MEDICINE CLINIC | Age: 59
End: 2019-06-05

## 2019-06-05 RX ORDER — HYDROXYZINE PAMOATE 50 MG/1
CAPSULE ORAL
Qty: 180 CAPSULE | Refills: 1 | Status: SHIPPED | OUTPATIENT
Start: 2019-06-05 | End: 2019-12-19 | Stop reason: SDUPTHER

## 2019-10-02 ENCOUNTER — OFFICE VISIT (OUTPATIENT)
Dept: FAMILY MEDICINE CLINIC | Age: 59
End: 2019-10-02
Payer: MEDICARE

## 2019-10-02 VITALS
BODY MASS INDEX: 28.99 KG/M2 | HEART RATE: 50 BPM | SYSTOLIC BLOOD PRESSURE: 130 MMHG | TEMPERATURE: 97.8 F | DIASTOLIC BLOOD PRESSURE: 85 MMHG | HEIGHT: 63 IN | OXYGEN SATURATION: 93 % | WEIGHT: 163.6 LBS

## 2019-10-02 DIAGNOSIS — J01.90 ACUTE BACTERIAL SINUSITIS: ICD-10-CM

## 2019-10-02 DIAGNOSIS — I10 ESSENTIAL HYPERTENSION: ICD-10-CM

## 2019-10-02 DIAGNOSIS — B96.89 ACUTE BACTERIAL SINUSITIS: ICD-10-CM

## 2019-10-02 DIAGNOSIS — G89.29 CHRONIC GENERALIZED PAIN: ICD-10-CM

## 2019-10-02 DIAGNOSIS — E78.00 HYPERCHOLESTEROLEMIA: ICD-10-CM

## 2019-10-02 DIAGNOSIS — F33.41 RECURRENT MAJOR DEPRESSIVE DISORDER, IN PARTIAL REMISSION (HCC): Primary | ICD-10-CM

## 2019-10-02 DIAGNOSIS — R52 CHRONIC GENERALIZED PAIN: ICD-10-CM

## 2019-10-02 DIAGNOSIS — F41.1 GAD (GENERALIZED ANXIETY DISORDER): ICD-10-CM

## 2019-10-02 PROCEDURE — G8427 DOCREV CUR MEDS BY ELIG CLIN: HCPCS | Performed by: INTERNAL MEDICINE

## 2019-10-02 PROCEDURE — G8484 FLU IMMUNIZE NO ADMIN: HCPCS | Performed by: INTERNAL MEDICINE

## 2019-10-02 PROCEDURE — 4004F PT TOBACCO SCREEN RCVD TLK: CPT | Performed by: INTERNAL MEDICINE

## 2019-10-02 PROCEDURE — G8417 CALC BMI ABV UP PARAM F/U: HCPCS | Performed by: INTERNAL MEDICINE

## 2019-10-02 PROCEDURE — 3017F COLORECTAL CA SCREEN DOC REV: CPT | Performed by: INTERNAL MEDICINE

## 2019-10-02 PROCEDURE — 99214 OFFICE O/P EST MOD 30 MIN: CPT | Performed by: INTERNAL MEDICINE

## 2019-10-02 RX ORDER — HYDROCHLOROTHIAZIDE 12.5 MG/1
12.5 CAPSULE, GELATIN COATED ORAL EVERY MORNING
Qty: 90 CAPSULE | Refills: 1 | Status: SHIPPED | OUTPATIENT
Start: 2019-10-02 | End: 2020-05-11 | Stop reason: SDUPTHER

## 2019-10-02 RX ORDER — AMOXICILLIN 875 MG/1
875 TABLET, COATED ORAL 2 TIMES DAILY
Qty: 14 TABLET | Refills: 0 | Status: SHIPPED | OUTPATIENT
Start: 2019-10-02 | End: 2019-10-09

## 2019-11-14 DIAGNOSIS — G47.9 SLEEPING DIFFICULTY: ICD-10-CM

## 2019-11-14 DIAGNOSIS — E78.00 HYPERCHOLESTEROLEMIA: ICD-10-CM

## 2019-11-14 DIAGNOSIS — I10 ESSENTIAL HYPERTENSION: ICD-10-CM

## 2019-11-15 RX ORDER — PSYLLIUM HUSK 3.4 G/7G
POWDER ORAL
Qty: 180 TABLET | Refills: 1 | Status: SHIPPED | OUTPATIENT
Start: 2019-11-15 | End: 2020-05-11

## 2019-11-15 RX ORDER — ATENOLOL 25 MG/1
TABLET ORAL
Qty: 90 TABLET | Refills: 1 | Status: SHIPPED | OUTPATIENT
Start: 2019-11-15 | End: 2020-05-11

## 2019-11-15 RX ORDER — SIMVASTATIN 40 MG
TABLET ORAL
Qty: 90 TABLET | Refills: 1 | Status: SHIPPED | OUTPATIENT
Start: 2019-11-15 | End: 2020-05-11

## 2019-12-16 RX ORDER — HYDROXYZINE PAMOATE 50 MG/1
CAPSULE ORAL
Qty: 60 CAPSULE | Refills: 0 | OUTPATIENT
Start: 2019-12-16

## 2019-12-20 RX ORDER — HYDROXYZINE PAMOATE 50 MG/1
CAPSULE ORAL
Qty: 180 CAPSULE | Refills: 1 | Status: SHIPPED | OUTPATIENT
Start: 2019-12-20 | End: 2020-05-20

## 2020-02-10 RX ORDER — DULOXETIN HYDROCHLORIDE 60 MG/1
CAPSULE, DELAYED RELEASE ORAL
Qty: 180 CAPSULE | Refills: 1 | Status: SHIPPED | OUTPATIENT
Start: 2020-02-10 | End: 2021-03-05

## 2020-03-06 RX ORDER — NAPROXEN 500 MG/1
TABLET ORAL
Qty: 180 TABLET | Refills: 1 | Status: SHIPPED | OUTPATIENT
Start: 2020-03-06 | End: 2021-05-11 | Stop reason: SDUPTHER

## 2020-03-06 NOTE — TELEPHONE ENCOUNTER
Last visit: 10/2/19  Last Med refill: 3/5/19  Does patient have enough medication for 72 hours: No:     Next Visit Date:  Future Appointments   Date Time Provider Willis May   4/7/2020  1:00 PM Tiffanie Chavira  [address] [facility]   Topic Date Due    Hepatitis C screen  1960    Pneumococcal 0-64 years Vaccine (1 of 1 - PPSV23) 03/24/1966    DTaP/Tdap/Td vaccine (1 - Tdap) 03/24/1971    HIV screen  03/24/1975    Cervical cancer screen  03/24/1981    Breast cancer screen  03/24/2010    Shingles Vaccine (1 of 2) 03/24/2010    Colon cancer screen colonoscopy  03/24/2010    Low dose CT lung screening  03/24/2015    Flu vaccine (1) 09/01/2019    A1C test (Diabetic or Prediabetic)  05/01/2020    Lipid screen  05/01/2020    Potassium monitoring  05/01/2020    Creatinine monitoring  05/01/2020    Hepatitis A vaccine  Aged Out    Hepatitis B vaccine  Aged Out    Hib vaccine  Aged Out    Meningococcal (ACWY) vaccine  Aged Out       Hemoglobin A1C (%)   Date Value   05/01/2019 5.7             ( goal A1C is < 7)   No results found for: LABMICR  LDL Cholesterol (mg/dL)   Date Value   05/01/2019 93   04/25/2018 103       (goal LDL is <100)   AST (U/L)   Date Value   05/01/2019 15     ALT (U/L)   Date Value   05/01/2019 16     BUN (mg/dL)   Date Value   05/01/2019 7     BP Readings from Last 3 Encounters:   10/02/19 130/85   05/14/19 112/74   04/30/19 110/74          (goal 120/80)    All Future Testing planned in [facility]              Patient Active Problem List:     Recurrent major depressive disorder, in partial remission ([facility] [address]     Essential hypertension     Hypercholesterolemia     ELY (generalized anxiety disorder)     Jordan-Danlos syndrome     Temporomandibular joint-pain-dysfunction syndrome (TMJ)     Sacroiliac pain     Chronic generalized pain

## 2020-04-07 ENCOUNTER — VIRTUAL VISIT (OUTPATIENT)
Dept: FAMILY MEDICINE CLINIC | Age: 60
End: 2020-04-07
Payer: MEDICARE

## 2020-04-07 VITALS — WEIGHT: 163.58 LBS | HEIGHT: 63 IN | BODY MASS INDEX: 28.98 KG/M2

## 2020-04-07 PROCEDURE — G8427 DOCREV CUR MEDS BY ELIG CLIN: HCPCS | Performed by: INTERNAL MEDICINE

## 2020-04-07 PROCEDURE — 4004F PT TOBACCO SCREEN RCVD TLK: CPT | Performed by: INTERNAL MEDICINE

## 2020-04-07 PROCEDURE — 3017F COLORECTAL CA SCREEN DOC REV: CPT | Performed by: INTERNAL MEDICINE

## 2020-04-07 PROCEDURE — G8417 CALC BMI ABV UP PARAM F/U: HCPCS | Performed by: INTERNAL MEDICINE

## 2020-04-07 PROCEDURE — 99213 OFFICE O/P EST LOW 20 MIN: CPT | Performed by: INTERNAL MEDICINE

## 2020-04-07 RX ORDER — TIMOLOL 5.12 MG/ML
SOLUTION/ DROPS OPHTHALMIC
COMMUNITY
Start: 2020-02-11 | End: 2021-05-11 | Stop reason: ALTCHOICE

## 2020-04-07 RX ORDER — BRIMONIDINE TARTRATE 2 MG/ML
SOLUTION/ DROPS OPHTHALMIC
COMMUNITY
Start: 2020-01-27 | End: 2021-05-11 | Stop reason: ALTCHOICE

## 2020-04-07 NOTE — PROGRESS NOTES
Patient is doing a follow up appointment  Pt states that she is still having issues with her eye and she has been taking the vitamins and getting shots in the eye but it is getting worse    Pharmacy

## 2020-04-14 RX ORDER — ATENOLOL 25 MG/1
TABLET ORAL
Qty: 90 TABLET | Refills: 1 | OUTPATIENT
Start: 2020-04-14

## 2020-04-14 RX ORDER — PSYLLIUM HUSK 3.4 G/7G
POWDER ORAL
Qty: 180 TABLET | Refills: 1 | OUTPATIENT
Start: 2020-04-14

## 2020-04-14 RX ORDER — SIMVASTATIN 40 MG
TABLET ORAL
Qty: 90 TABLET | Refills: 1 | OUTPATIENT
Start: 2020-04-14

## 2020-05-11 RX ORDER — ATENOLOL 25 MG/1
TABLET ORAL
Qty: 90 TABLET | Refills: 1 | Status: SHIPPED | OUTPATIENT
Start: 2020-05-11 | End: 2020-11-13

## 2020-05-11 RX ORDER — HYDROCHLOROTHIAZIDE 12.5 MG/1
12.5 CAPSULE, GELATIN COATED ORAL EVERY MORNING
Qty: 90 CAPSULE | Refills: 1 | Status: SHIPPED | OUTPATIENT
Start: 2020-05-11 | End: 2020-12-09

## 2020-05-11 RX ORDER — PSYLLIUM HUSK 3.4 G/7G
POWDER ORAL
Qty: 180 TABLET | Refills: 1 | Status: SHIPPED | OUTPATIENT
Start: 2020-05-11 | End: 2021-05-11 | Stop reason: ALTCHOICE

## 2020-05-11 RX ORDER — SIMVASTATIN 40 MG
TABLET ORAL
Qty: 90 TABLET | Refills: 1 | Status: SHIPPED | OUTPATIENT
Start: 2020-05-11 | End: 2020-12-29

## 2020-05-11 NOTE — TELEPHONE ENCOUNTER
Last visit: 04/07/2020  Last Med refill: 04/06/2020  Does patient have enough medication for 72 hours: Yes    Next Visit Date:  Future Appointments   Date Time Provider Willis Yadira   10/7/2020  1:00 PM Tiffanie Gold  [address] [facility]   Topic Date Due    Hepatitis C screen  1960    Pneumococcal 0-64 years Vaccine (1 of 1 - PPSV23) 03/24/1966    HIV screen  03/24/1975    DTaP/Tdap/Td vaccine (1 - Tdap) 03/24/1979    Cervical cancer screen  03/24/1981    Breast cancer screen  03/24/2010    Shingles Vaccine (1 of 2) 03/24/2010    Colon cancer screen colonoscopy  03/24/2010    Low dose CT lung screening  03/24/2015    A1C test (Diabetic or Prediabetic)  05/01/2020    Potassium monitoring  05/01/2020    Creatinine monitoring  05/01/2020    Lipid screen  05/01/2020    Flu vaccine (Season Ended) 09/01/2020    Hepatitis A vaccine  Aged Out    Hepatitis B vaccine  Aged Out    Hib vaccine  Aged Out    Meningococcal (ACWY) vaccine  Aged Out       Hemoglobin A1C (%)   Date Value   05/01/2019 5.7             ( goal A1C is < 7)   No results found for: LABMICR  LDL Cholesterol (mg/dL)   Date Value   05/01/2019 93   04/25/2018 103       (goal LDL is <100)   AST (U/L)   Date Value   05/01/2019 15     ALT (U/L)   Date Value   05/01/2019 16     BUN (mg/dL)   Date Value   05/01/2019 7     BP Readings from Last 3 Encounters:   10/02/19 130/85   05/14/19 112/74   04/30/19 110/74          (goal 120/80)    All Future Testing planned in [facility]              Patient Active Problem List:     Recurrent major depressive disorder, in partial remission ([facility] [address]     Essential hypertension     Hypercholesterolemia     ELY (generalized anxiety disorder)     Jordan-Danlos syndrome     Temporomandibular joint-pain-dysfunction syndrome (TMJ)     Sacroiliac pain     Chronic generalized pain

## 2020-05-18 NOTE — TELEPHONE ENCOUNTER
Last visit: 4/7/20  Last Med refill: 12/20/19  Does patient have enough medication for 72 hours: Yes    Next Visit Date:  Future Appointments   Date Time Provider Willis May   10/7/2020  1:00 PM Tiffanie Johnson  [address] Maintenance   Topic Date Due    Hepatitis C screen  1960    Pneumococcal 0-64 years Vaccine (1 of 1 - PPSV23) 03/24/1966    HIV screen  03/24/1975    DTaP/Tdap/Td vaccine (1 - Tdap) 03/24/1979    Cervical cancer screen  03/24/1981    Breast cancer screen  03/24/2010    Shingles Vaccine (1 of 2) 03/24/2010    Colon cancer screen colonoscopy  03/24/2010    Low dose CT lung screening  03/24/2015    A1C test (Diabetic or Prediabetic)  05/01/2020    Potassium monitoring  05/01/2020    Creatinine monitoring  05/01/2020    Lipid screen  05/01/2020    Flu vaccine (Season Ended) 09/01/2020    Hepatitis A vaccine  Aged Out    Hepatitis B vaccine  Aged Out    Hib vaccine  Aged Out    Meningococcal (ACWY) vaccine  Aged Out       Hemoglobin A1C (%)   Date Value   05/01/2019 5.7             ( goal A1C is < 7)   No results found for: LABMICR  LDL Cholesterol (mg/dL)   Date Value   05/01/2019 93   04/25/2018 103       (goal LDL is <100)   AST (U/L)   Date Value   05/01/2019 15     ALT (U/L)   Date Value   05/01/2019 16     BUN (mg/dL)   Date Value   05/01/2019 7     BP Readings from Last 3 Encounters:   10/02/19 130/85   05/14/19 112/74   04/30/19 110/74          (goal 120/80)    All Future Testing planned in [facility]              Patient Active Problem List:     Recurrent major depressive disorder, in partial remission ([facility] [address]     Essential hypertension     Hypercholesterolemia     ELY (generalized anxiety disorder)     Jordan-Danlos syndrome     Temporomandibular joint-pain-dysfunction syndrome (TMJ)     Sacroiliac pain     Chronic generalized pain

## 2020-05-20 RX ORDER — HYDROXYZINE PAMOATE 50 MG/1
CAPSULE ORAL
Qty: 180 CAPSULE | Refills: 1 | Status: SHIPPED | OUTPATIENT
Start: 2020-05-20 | End: 2020-06-25

## 2020-06-12 RX ORDER — HYDROXYZINE PAMOATE 50 MG/1
CAPSULE ORAL
Qty: 180 CAPSULE | Refills: 1 | OUTPATIENT
Start: 2020-06-12

## 2020-06-25 RX ORDER — HYDROXYZINE PAMOATE 50 MG/1
CAPSULE ORAL
Qty: 180 CAPSULE | Refills: 1 | Status: SHIPPED | OUTPATIENT
Start: 2020-06-25 | End: 2021-01-18

## 2020-10-07 ENCOUNTER — OFFICE VISIT (OUTPATIENT)
Dept: FAMILY MEDICINE CLINIC | Age: 60
End: 2020-10-07
Payer: MEDICARE

## 2020-10-07 ENCOUNTER — HOSPITAL ENCOUNTER (OUTPATIENT)
Age: 60
Setting detail: SPECIMEN
Discharge: HOME | End: 2020-10-07
Payer: MEDICARE

## 2020-10-07 VITALS
BODY MASS INDEX: 27.39 KG/M2 | SYSTOLIC BLOOD PRESSURE: 124 MMHG | OXYGEN SATURATION: 96 % | WEIGHT: 154.6 LBS | DIASTOLIC BLOOD PRESSURE: 76 MMHG | TEMPERATURE: 98 F | HEART RATE: 85 BPM | HEIGHT: 63 IN

## 2020-10-07 LAB
ALBUMIN SERPL-MCNC: 4.1 G/DL (ref 3.5–5.2)
ALBUMIN/GLOBULIN RATIO: 2.1 (ref 1–2.5)
ALP BLD-CCNC: 112 U/L (ref 35–104)
ALT SERPL-CCNC: 15 U/L (ref 5–33)
ANION GAP SERPL CALCULATED.3IONS-SCNC: 14 MMOL/L (ref 9–17)
AST SERPL-CCNC: 15 U/L
BILIRUB SERPL-MCNC: 0.38 MG/DL (ref 0.3–1.2)
BUN BLDV-MCNC: 9 MG/DL (ref 8–23)
BUN/CREAT BLD: ABNORMAL (ref 9–20)
CALCIUM SERPL-MCNC: 9.3 MG/DL (ref 8.6–10.4)
CHLORIDE BLD-SCNC: 107 MMOL/L (ref 98–107)
CHOLESTEROL, FASTING: 157 MG/DL
CHOLESTEROL/HDL RATIO: 3.6
CO2: 27 MMOL/L (ref 20–31)
CREAT SERPL-MCNC: 0.66 MG/DL (ref 0.5–0.9)
GFR AFRICAN AMERICAN: >60 ML/MIN
GFR NON-AFRICAN AMERICAN: >60 ML/MIN
GFR SERPL CREATININE-BSD FRML MDRD: ABNORMAL ML/MIN/{1.73_M2}
GFR SERPL CREATININE-BSD FRML MDRD: ABNORMAL ML/MIN/{1.73_M2}
GLUCOSE BLD-MCNC: 111 MG/DL (ref 70–99)
HBA1C MFR BLD: 5.7 %
HDLC SERPL-MCNC: 44 MG/DL
HEPATITIS C ANTIBODY: NONREACTIVE
HIV AG/AB: NONREACTIVE
LDL CHOLESTEROL: 86 MG/DL (ref 0–130)
POTASSIUM SERPL-SCNC: 4.1 MMOL/L (ref 3.7–5.3)
SODIUM BLD-SCNC: 148 MMOL/L (ref 135–144)
TOTAL PROTEIN: 6.1 G/DL (ref 6.4–8.3)
TRIGLYCERIDE, FASTING: 136 MG/DL
TSH SERPL DL<=0.05 MIU/L-ACNC: 2.54 MIU/L (ref 0.3–5)
VLDLC SERPL CALC-MCNC: NORMAL MG/DL (ref 1–30)

## 2020-10-07 PROCEDURE — G8484 FLU IMMUNIZE NO ADMIN: HCPCS | Performed by: INTERNAL MEDICINE

## 2020-10-07 PROCEDURE — 99214 OFFICE O/P EST MOD 30 MIN: CPT | Performed by: INTERNAL MEDICINE

## 2020-10-07 PROCEDURE — 3017F COLORECTAL CA SCREEN DOC REV: CPT | Performed by: INTERNAL MEDICINE

## 2020-10-07 PROCEDURE — 4004F PT TOBACCO SCREEN RCVD TLK: CPT | Performed by: INTERNAL MEDICINE

## 2020-10-07 PROCEDURE — 83036 HEMOGLOBIN GLYCOSYLATED A1C: CPT | Performed by: INTERNAL MEDICINE

## 2020-10-07 PROCEDURE — G8427 DOCREV CUR MEDS BY ELIG CLIN: HCPCS | Performed by: INTERNAL MEDICINE

## 2020-10-07 PROCEDURE — G8417 CALC BMI ABV UP PARAM F/U: HCPCS | Performed by: INTERNAL MEDICINE

## 2020-10-07 RX ORDER — LATANOPROST 50 UG/ML
1 SOLUTION/ DROPS OPHTHALMIC
COMMUNITY
Start: 2020-09-23 | End: 2021-05-11 | Stop reason: ALTCHOICE

## 2020-10-07 RX ORDER — BRINZOLAMIDE 1 %
1 SUSPENSION, DROPS(FINAL DOSAGE FORM)(ML) OPHTHALMIC (EYE) 3 TIMES DAILY
COMMUNITY
Start: 2020-08-12 | End: 2021-05-11 | Stop reason: ALTCHOICE

## 2020-10-07 NOTE — PROGRESS NOTES
Subjective:       Patient ID:     Jordan Woo is a 61 y.o. female who presents for   Chief Complaint   Patient presents with    Diabetes    Follow-up       HPI:  Nursing note reviewed and discussed with patient. R eye vision is worse, can tell light and dark, the pupil is always dilated. Would like to get second opinion. Has not sought 1 so far due to the COVID-19 pandemic. Stomach is growling more and more. Feels gassy and distended. She has been trying to watch what she eats, her to do that during the pandemic. However reports that she has lost weight when she is not intending to. Has worsening chronic diarrhea, nonbloody loose or watery stools. Ongoing for years. No abdominal pain, nausea, vomiting. Anxiety depression are worse due to to ongoing COVID-19 pandemic. She has been more isolated than ever by herself in her home. Hypertension-tolerating current meds for the chest pain, palpitations, dizziness, orthopnea, peripheral edema. Hyperlipidemia tolerating simvastatin without myalgias, jaundice, dyspepsia,. Patient's medications, allergies, past medical, surgical, social and family histories were reviewed and updated as appropriate.     Past Medical History:   Diagnosis Date    Anxiety and depression     Arthritis     Chronic back pain     Degenerative joint disease     Diarrhea     Hyperlipidemia     Osteopenia      Past Surgical History:   Procedure Laterality Date    CERVICAL FUSION       SECTION      x 2    COLONOSCOPY      CYST REMOVAL Left     second finger    EYE SURGERY Bilateral     lasik    NASAL SEPTUM SURGERY Bilateral     OTHER SURGICAL HISTORY  2/23/15    exc. lt axillary mass    TONSILLECTOMY      TUBAL LIGATION         Social History     Tobacco Use    Smoking status: Current Every Day Smoker     Packs/day: 0.75     Years: 41.00     Pack years: 30.75    Smokeless tobacco: Never Used   Substance Use Topics    Alcohol use: No      Patient Active Problem List   Diagnosis    Recurrent major depressive disorder, in partial remission ([facility] [address]    Essential hypertension    Hypercholesterolemia    ELY (generalized anxiety disorder)    Jordan-Danlos syndrome    Temporomandibular joint-pain-dysfunction syndrome (TMJ)    Sacroiliac pain    Chronic generalized pain         Prior to Visit Medications    Medication Sig Taking? Authorizing Provider   AZOPT 1 % ophthalmic suspension Place 1 drop into the right eye 3 times daily Yes Josse Berry MD   latanoprost (XALATAN) 0.005 % ophthalmic solution Place 1 drop into both eyes Yes Josse Berry MD   hydrOXYzine (VISTARIL) 50 MG capsule take 1 capsule by mouth twice a day Yes Tiffanie Bower MD   atenolol (TENORMIN) 25 MG tablet take 1 tablet by mouth once daily Yes Tiffanie Bower MD   RA MELATONIN 5 MG TABS tablet take 2 tablets by mouth every evening Yes Tiffanie Bower MD   simvastatin (ZOCOR) 40 MG tablet take 1 tablet by mouth once daily Yes Jerson Molina MD   hydroCHLOROthiazide (MICROZIDE) 12.5 MG capsule Take 1 capsule by mouth every morning Yes Tiffanie Bower MD   BETIMOL 0.5 % ophthalmic solution  Yes Historical Provider, MD   brimonidine (ALPHAGAN) 0.2 % ophthalmic solution  Yes Historical Provider, MD   naproxen (NAPROSYN) 500 MG tablet take 1 tablet by mouth twice a day with food Yes Tiffanie Bower MD   DULoxetine (CYMBALTA) 60 MG extended release capsule take 1 capsule by mouth twice a day Yes Tiffanie Bower MD   tiZANidine (ZANAFLEX) 4 MG tablet Take 4 mg by mouth nightly Yes Historical Provider, MD     Review of Systems  Review of Systems   Constitutional: Negative for fatigue, fever and unexpected weight change. Eyes: Positive for visual disturbance. Respiratory: Negative for cough, choking, chest tightness, shortness of breath and wheezing. Cardiovascular: Negative for chest pain, palpitations and leg swelling.    Gastrointestinal: Positive for abdominal warm and dry. Capillary Refill: Capillary refill takes less than 2 seconds. Neurological:      General: No focal deficit present. Mental Status: She is alert and oriented to person, place, and time. Data Review  not applicable       Assessment/Plan:      1. Pre-diabetes  - POCT glycosylated hemoglobin (Hb A1C)    2. 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 Night blindness  - CT HEAD W CONTRAST; Future    5. Jordan-Danlos syndrome  Stable, no current issues. 6. Hypercholesterolemia  Continue current management  - Lipid, Fasting; Future  - Comprehensive Metabolic Panel; Future    7. Essential hypertension  Continue current management. - Comprehensive Metabolic Panel; Future    8. Recurrent major depressive disorder, in partial remission (HCC)  Continue current regimen. 9. ELY (generalized anxiety disorder)  Continue current regimen. 10. Chronic generalized pain  Afraid to take NSAIDs consistently due to fear of dependency. Continues to take them as needed. 11. Chronic diarrhea  - Fecal Fat, Qualitative; Future  - Fecal lactoferrin; Future  - Pancreatic Elastase, Fecal; Future  - POCT Fecal Immunochemical Test (FIT); Future    12. Screening for thyroid disorder  - TSH With Reflex Ft4; Future    13. Screening mammography declined  Counseling offered, patient declines today. 15. Encounter for hepatitis C screening test for low risk patient  - Hepatitis C Antibody; Future    15. Screening for HIV (human immunodeficiency virus)  - HIV Screen;  Future           Health Maintenance Due   Topic Date Due    Hepatitis C screen  1960    Pneumococcal 0-64 years Vaccine (1 of 1 - PPSV23) 03/24/1966    HIV screen  03/24/1975    DTaP/Tdap/Td vaccine (1 - Tdap) 03/24/1979    Cervical cancer screen  03/24/1981    Breast cancer screen  03/24/2010    Shingles Vaccine (1 of 2) 03/24/2010    Colon

## 2020-10-08 ENCOUNTER — HOSPITAL ENCOUNTER (OUTPATIENT)
Age: 60
Setting detail: SPECIMEN
Discharge: HOME | End: 2020-10-08
Payer: MEDICARE

## 2020-10-08 LAB — LACTOFERRIN, QUAL: ABNORMAL

## 2020-10-10 LAB
FAT QUALITATIVE SPLIT STOOL: NORMAL
FECAL NEUTRAL FAT: NORMAL

## 2020-10-11 LAB — FECAL PANCREATIC ELASTASE-1: >800 UG/G

## 2020-10-18 ASSESSMENT — VISUAL ACUITY: OU: 1

## 2020-10-18 ASSESSMENT — ENCOUNTER SYMPTOMS
ABDOMINAL DISTENTION: 1
NAUSEA: 0
ANAL BLEEDING: 0
BLOOD IN STOOL: 0
COUGH: 0
CHEST TIGHTNESS: 0
VOMITING: 0
CHOKING: 0
ABDOMINAL PAIN: 0
WHEEZING: 0
DIARRHEA: 1
SHORTNESS OF BREATH: 0
CONSTIPATION: 0

## 2020-10-27 ENCOUNTER — TELEPHONE (OUTPATIENT)
Dept: FAMILY MEDICINE CLINIC | Age: 60
End: 2020-10-27

## 2020-10-28 ENCOUNTER — TELEPHONE (OUTPATIENT)
Dept: FAMILY MEDICINE CLINIC | Age: 60
End: 2020-10-28

## 2020-11-12 NOTE — TELEPHONE ENCOUNTER
Last visit: 10/7/20  Last Med refill: 5/11/20  Does patient have enough medication for 72 hours: No:     Next Visit Date:  Future Appointments   Date Time Provider Willis May   4/7/2021  1:00 PM Tiffanie Andres  [address] [facility]   Topic Date Due    Breast cancer screen  03/24/2010    Colon cancer screen colonoscopy  03/24/2010    DTaP/Tdap/Td vaccine (1 - Tdap) 10/07/2021 (Originally 3/24/1979)    Cervical cancer screen  10/07/2021 (Originally 3/24/1981)    Flu vaccine (1) 10/07/2021 (Originally 9/1/2020)    Shingles Vaccine (1 of 2) 10/07/2021 (Originally 3/24/2010)    Pneumococcal 0-64 years Vaccine (1 of 1 - PPSV23) 10/07/2021 (Originally [birth year]/24/1966)    A1C test (Diabetic or Prediabetic)  10/07/2021    Lipid screen  10/07/2021    Potassium monitoring  10/07/2021    Creatinine monitoring  10/07/2021    Hepatitis C screen  Completed    HIV screen  Completed    Hepatitis A vaccine  Aged Out    Hepatitis B vaccine  Aged Out    Hib vaccine  Aged Out    Meningococcal (ACWY) vaccine  Aged Out    Low dose CT lung screening  Discontinued       Hemoglobin A1C (%)   Date Value   10/07/2020 5.7   05/01/2019 5.7             ( goal A1C is < 7)   No results found for: LABMICR  LDL Cholesterol (mg/dL)   Date Value   10/07/2020 86   05/01/2019 93       (goal LDL is <100)   AST (U/L)   Date Value   10/07/2020 15     ALT (U/L)   Date Value   10/07/2020 15     BUN (mg/dL)   Date Value   10/07/2020 9     BP Readings from Last 3 Encounters:   10/07/20 124/76   10/02/19 130/85   05/14/19 112/74          (goal 120/80)    All Future Testing planned in [facility]  Lab Frequency Next Occurrence   POCT Fecal Immunochemical Test (FIT) Once 12/02/2020   CT HEAD W WO CONTRAST Once 12/11/2020   CT HEAD WO CONTRAST Once 10/28/2020               Patient Active Problem List:     Recurrent major depressive disorder, in partial remission ([facility] [address]     Essential hypertension Hypercholesterolemia     ELY (generalized anxiety disorder)     Jordan-Danlos syndrome     Temporomandibular joint-pain-dysfunction syndrome (TMJ)     Sacroiliac pain     Chronic generalized pain

## 2020-11-13 RX ORDER — ATENOLOL 25 MG/1
TABLET ORAL
Qty: 90 TABLET | Refills: 1 | Status: SHIPPED | OUTPATIENT
Start: 2020-11-13 | End: 2021-05-11 | Stop reason: SDUPTHER

## 2020-12-08 NOTE — TELEPHONE ENCOUNTER
Last visit: 10/7/20  Last Med refill: 5/11/20  Does patient have enough medication for 72 hours: No:     Next Visit Date:  Future Appointments   Date Time Provider Willis May   4/7/2021  1:00 PM Tiffanie Murphy  [address] Maintenance   Topic Date Due    Breast cancer screen  03/24/2010    Colon cancer screen colonoscopy  03/24/2010    DTaP/Tdap/Td vaccine (1 - Tdap) 10/07/2021 (Originally 3/24/1979)    Cervical cancer screen  10/07/2021 (Originally 3/24/1981)    Flu vaccine (1) 10/07/2021 (Originally 9/1/2020)    Shingles Vaccine (1 of 2) 10/07/2021 (Originally 3/24/2010)    Pneumococcal 0-64 years Vaccine (1 of 1 - PPSV23) 10/07/2021 (Originally 3/24/1966)    A1C test (Diabetic or Prediabetic)  10/07/2021    Lipid screen  10/07/2021    Potassium monitoring  10/07/2021    Creatinine monitoring  11/11/2021    Hepatitis C screen  Completed    HIV screen  Completed    Hepatitis A vaccine  Aged Out    Hepatitis B vaccine  Aged Out    Hib vaccine  Aged Out    Meningococcal (ACWY) vaccine  Aged Out    Low dose CT lung screening  Discontinued       Hemoglobin A1C (%)   Date Value   10/07/2020 5.7   05/01/2019 5.7             ( goal A1C is < 7)   No results found for: LABMICR  LDL Cholesterol (mg/dL)   Date Value   10/07/2020 86   05/01/2019 93       (goal LDL is <100)   AST (U/L)   Date Value   10/07/2020 15     ALT (U/L)   Date Value   10/07/2020 15     BUN (mg/dL)   Date Value   10/07/2020 9     BP Readings from Last 3 Encounters:   10/07/20 124/76   10/02/19 130/85   05/14/19 112/74          (goal 120/80)    All Future Testing planned in [facility]  Lab Frequency Next Occurrence   POCT Fecal Immunochemical Test (FIT) Once 01/03/2021   CT HEAD WO CONTRAST Once 10/28/2020               Patient Active Problem List:     Recurrent major depressive disorder, in partial remission ([facility] [address]     Essential hypertension     Hypercholesterolemia     ELY (generalized anxiety disorder) Jordan-Danlos syndrome     Temporomandibular joint-pain-dysfunction syndrome (TMJ)     Sacroiliac pain     Chronic generalized pain

## 2020-12-09 RX ORDER — HYDROCHLOROTHIAZIDE 12.5 MG/1
12.5 CAPSULE, GELATIN COATED ORAL EVERY MORNING
Qty: 90 CAPSULE | Refills: 1 | Status: SHIPPED | OUTPATIENT
Start: 2020-12-09 | End: 2021-05-11 | Stop reason: SDUPTHER

## 2020-12-11 ENCOUNTER — TELEPHONE (OUTPATIENT)
Dept: FAMILY MEDICINE CLINIC | Age: 60
End: 2020-12-11

## 2020-12-11 NOTE — TELEPHONE ENCOUNTER
Patient requested new referral to Ophthalmology due to not being satisfied with [address] [place] [address]. Spoke to someone at [facility], [place], stated Dr. Kate Catalan would be best for that.      Referral faxed

## 2020-12-28 NOTE — TELEPHONE ENCOUNTER
Last visit: 10/7/20  Last Med refill: 5/11/20  Does patient have enough medication for 72 hours: No:     Next Visit Date:  Future Appointments   Date Time Provider Willis May   4/7/2021  1:00 PM Tiffanie Woo  [address] Maintenance   Topic Date Due    Breast cancer screen  03/24/2010    Colon cancer screen colonoscopy  03/24/2010    DTaP/Tdap/Td vaccine (1 - Tdap) 10/07/2021 (Originally 3/24/1979)    Cervical cancer screen  10/07/2021 (Originally 3/24/1981)    Flu vaccine (1) 10/07/2021 (Originally 9/1/2020)    Shingles Vaccine (1 of 2) 10/07/2021 (Originally 3/24/2010)    Pneumococcal 0-64 years Vaccine (1 of 1 - PPSV23) 10/07/2021 (Originally [birth year]/24/1966)    A1C test (Diabetic or Prediabetic)  10/07/2021    Lipid screen  10/07/2021    Potassium monitoring  10/07/2021    Creatinine monitoring  11/11/2021    Hepatitis C screen  Completed    HIV screen  Completed    Hepatitis A vaccine  Aged Out    Hepatitis B vaccine  Aged Out    Hib vaccine  Aged Out    Meningococcal (ACWY) vaccine  Aged Out    Low dose CT lung screening  Discontinued       Hemoglobin A1C (%)   Date Value   10/07/2020 5.7   05/01/2019 5.7             ( goal A1C is < 7)   No results found for: LABMICR  LDL Cholesterol (mg/dL)   Date Value   10/07/2020 86   05/01/2019 93       (goal LDL is <100)   AST (U/L)   Date Value   10/07/2020 15     ALT (U/L)   Date Value   10/07/2020 15     BUN (mg/dL)   Date Value   10/07/2020 9     BP Readings from Last 3 Encounters:   10/07/20 124/76   10/02/19 130/85   05/14/19 112/74          (goal 120/80)    All Future Testing planned in [facility]  Lab Frequency Next Occurrence   POCT Fecal Immunochemical Test (FIT) Once 01/03/2021               Patient Active Problem List:     Recurrent major depressive disorder, in partial remission ([facility] [address]     Essential hypertension     Hypercholesterolemia     ELY (generalized anxiety disorder)     Jordan-Danlos syndrome Temporomandibular joint-pain-dysfunction syndrome (TMJ)     Sacroiliac pain     Chronic generalized pain

## 2020-12-29 RX ORDER — SIMVASTATIN 40 MG
TABLET ORAL
Qty: 90 TABLET | Refills: 1 | Status: SHIPPED | OUTPATIENT
Start: 2020-12-29 | End: 2021-05-11 | Stop reason: SDUPTHER

## 2021-01-15 NOTE — TELEPHONE ENCOUNTER
Last visit: 10/7/20  Last Med refill: 6/25/20  Does patient have enough medication for 72 hours: No:     Next Visit Date:  Future Appointments   Date Time Provider Willis May   4/7/2021  1:00 PM Tiffanie Betancourt  [address] Maintenance   Topic Date Due    Breast cancer screen  03/24/2010    Colon cancer screen colonoscopy  03/24/2010    DTaP/Tdap/Td vaccine (1 - Tdap) 10/07/2021 (Originally 3/24/1979)    Cervical cancer screen  10/07/2021 (Originally 3/24/1981)    Flu vaccine (1) 10/07/2021 (Originally 9/1/2020)    Shingles Vaccine (1 of 2) 10/07/2021 (Originally 3/24/2010)    Pneumococcal 0-64 years Vaccine (1 of 1 - PPSV23) 10/07/2021 (Originally [birth year]24/1966)    A1C test (Diabetic or Prediabetic)  10/07/2021    Lipid screen  10/07/2021    Potassium monitoring  10/07/2021    Creatinine monitoring  11/11/2021    Hepatitis C screen  Completed    HIV screen  Completed    Hepatitis A vaccine  Aged Out    Hepatitis B vaccine  Aged Out    Hib vaccine  Aged Out    Meningococcal (ACWY) vaccine  Aged Out    Low dose CT lung screening  Discontinued       Hemoglobin A1C (%)   Date Value   10/07/2020 5.7   05/01/2019 5.7             ( goal A1C is < 7)   No results found for: LABMICR  LDL Cholesterol (mg/dL)   Date Value   10/07/2020 86   05/01/2019 93       (goal LDL is <100)   AST (U/L)   Date Value   10/07/2020 15     ALT (U/L)   Date Value   10/07/2020 15     BUN (mg/dL)   Date Value   10/07/2020 9     BP Readings from Last 3 Encounters:   10/07/20 124/76   10/02/19 130/85   05/14/19 112/74          (goal 120/80)    All Future Testing planned in [facility]  Lab Frequency Next Occurrence   POCT Fecal Immunochemical Test (FIT) Once 02/05/2021               Patient Active Problem List:     Recurrent major depressive disorder, in partial remission ([facility] [address]     Essential hypertension     Hypercholesterolemia     ELY (generalized anxiety disorder)     Jordan-Danlos syndrome Temporomandibular joint-pain-dysfunction syndrome (TMJ)     Sacroiliac pain     Chronic generalized pain

## 2021-01-18 RX ORDER — HYDROXYZINE PAMOATE 50 MG/1
CAPSULE ORAL
Qty: 180 CAPSULE | Refills: 1 | Status: SHIPPED | OUTPATIENT
Start: 2021-01-18 | End: 2021-05-11 | Stop reason: SDUPTHER

## 2021-03-04 NOTE — TELEPHONE ENCOUNTER
Last visit: 10/7/20  Last Med refill: 2/10/20  Does patient have enough medication for 72 hours: No:     Next Visit Date:  Future Appointments   Date Time Provider Willis May   4/7/2021  1:00 PM Tiffanie Esquivel  [address] Maintenance   Topic Date Due    COVID-19 Vaccine (1 of 2) Never done    Breast cancer screen  Never done    DTaP/Tdap/Td vaccine (1 - Tdap) 10/07/2021 (Originally 3/24/1979)    Cervical cancer screen  10/07/2021 (Originally 3/24/1981)    Flu vaccine (1) 10/07/2021 (Originally 9/1/2020)    Shingles Vaccine (1 of 2) 10/07/2021 (Originally 3/24/2010)    Pneumococcal 0-64 years Vaccine (1 of 1 - PPSV23) 10/07/2021 (Originally [birth year]24/1966)    A1C test (Diabetic or Prediabetic)  10/07/2021    Lipid screen  10/07/2021    Potassium monitoring  10/07/2021    Creatinine monitoring  11/11/2021    Colon cancer screen colonoscopy  11/06/2023    Hepatitis C screen  Completed    HIV screen  Completed    Hepatitis A vaccine  Aged Out    Hepatitis B vaccine  Aged Out    Hib vaccine  Aged Out    Meningococcal (ACWY) vaccine  Aged Out    Low dose CT lung screening  Discontinued       Hemoglobin A1C (%)   Date Value   10/07/2020 5.7   05/01/2019 5.7             ( goal A1C is < 7)   No results found for: LABMICR  LDL Cholesterol (mg/dL)   Date Value   10/07/2020 86   05/01/2019 93       (goal LDL is <100)   AST (U/L)   Date Value   10/07/2020 15     ALT (U/L)   Date Value   10/07/2020 15     BUN (mg/dL)   Date Value   10/07/2020 9     BP Readings from Last 3 Encounters:   10/07/20 124/76   10/02/19 130/85   05/14/19 112/74          (goal 120/80)    All Future Testing planned in [facility]  Lab Frequency Next Occurrence   POCT Fecal Immunochemical Test (FIT) Once 03/09/2021               Patient Active Problem List:     Recurrent major depressive disorder, in partial remission ([facility] [address]     Essential hypertension     Hypercholesterolemia     ELY (generalized anxiety disorder)     Jordan-Danlos syndrome     Temporomandibular joint-pain-dysfunction syndrome (TMJ)     Sacroiliac pain     Chronic generalized pain

## 2021-03-05 RX ORDER — DULOXETIN HYDROCHLORIDE 60 MG/1
CAPSULE, DELAYED RELEASE ORAL
Qty: 180 CAPSULE | Refills: 1 | Status: SHIPPED | OUTPATIENT
Start: 2021-03-05 | End: 2021-11-15 | Stop reason: SDUPTHER

## 2021-04-06 DIAGNOSIS — G89.29 CHRONIC GENERALIZED PAIN: ICD-10-CM

## 2021-04-06 DIAGNOSIS — R52 CHRONIC GENERALIZED PAIN: ICD-10-CM

## 2021-04-06 RX ORDER — NAPROXEN 500 MG/1
TABLET ORAL
Qty: 180 TABLET | Refills: 1 | OUTPATIENT
Start: 2021-04-06

## 2021-05-11 ENCOUNTER — OFFICE VISIT (OUTPATIENT)
Dept: FAMILY MEDICINE CLINIC | Age: 61
End: 2021-05-11
Payer: MEDICARE

## 2021-05-11 VITALS
WEIGHT: 150.4 LBS | HEART RATE: 55 BPM | DIASTOLIC BLOOD PRESSURE: 80 MMHG | BODY MASS INDEX: 26.64 KG/M2 | SYSTOLIC BLOOD PRESSURE: 130 MMHG | TEMPERATURE: 98.1 F | OXYGEN SATURATION: 96 %

## 2021-05-11 DIAGNOSIS — K21.9 GASTROESOPHAGEAL REFLUX DISEASE, UNSPECIFIED WHETHER ESOPHAGITIS PRESENT: ICD-10-CM

## 2021-05-11 DIAGNOSIS — E78.00 HYPERCHOLESTEROLEMIA: ICD-10-CM

## 2021-05-11 DIAGNOSIS — F33.41 RECURRENT MAJOR DEPRESSIVE DISORDER, IN PARTIAL REMISSION (HCC): ICD-10-CM

## 2021-05-11 DIAGNOSIS — I10 ESSENTIAL HYPERTENSION: Primary | ICD-10-CM

## 2021-05-11 DIAGNOSIS — F41.1 GAD (GENERALIZED ANXIETY DISORDER): ICD-10-CM

## 2021-05-11 DIAGNOSIS — R52 CHRONIC GENERALIZED PAIN: ICD-10-CM

## 2021-05-11 DIAGNOSIS — Z12.31 ENCOUNTER FOR SCREENING MAMMOGRAM FOR BREAST CANCER: ICD-10-CM

## 2021-05-11 DIAGNOSIS — G89.29 CHRONIC GENERALIZED PAIN: ICD-10-CM

## 2021-05-11 DIAGNOSIS — J01.90 ACUTE BACTERIAL SINUSITIS: ICD-10-CM

## 2021-05-11 DIAGNOSIS — B96.89 ACUTE BACTERIAL SINUSITIS: ICD-10-CM

## 2021-05-11 DIAGNOSIS — Q79.60 EHLERS-DANLOS SYNDROME: ICD-10-CM

## 2021-05-11 PROCEDURE — 3017F COLORECTAL CA SCREEN DOC REV: CPT | Performed by: INTERNAL MEDICINE

## 2021-05-11 PROCEDURE — 99214 OFFICE O/P EST MOD 30 MIN: CPT | Performed by: INTERNAL MEDICINE

## 2021-05-11 PROCEDURE — G8427 DOCREV CUR MEDS BY ELIG CLIN: HCPCS | Performed by: INTERNAL MEDICINE

## 2021-05-11 PROCEDURE — 4004F PT TOBACCO SCREEN RCVD TLK: CPT | Performed by: INTERNAL MEDICINE

## 2021-05-11 PROCEDURE — G8417 CALC BMI ABV UP PARAM F/U: HCPCS | Performed by: INTERNAL MEDICINE

## 2021-05-11 RX ORDER — OMEPRAZOLE 20 MG/1
1 CAPSULE, DELAYED RELEASE ORAL DAILY
COMMUNITY
End: 2021-05-11 | Stop reason: SDUPTHER

## 2021-05-11 RX ORDER — ATENOLOL 25 MG/1
TABLET ORAL
Qty: 90 TABLET | Refills: 1 | Status: SHIPPED | OUTPATIENT
Start: 2021-05-11 | End: 2021-11-15 | Stop reason: SDUPTHER

## 2021-05-11 RX ORDER — PILOCARPINE HYDROCHLORIDE 10 MG/ML
SOLUTION/ DROPS OPHTHALMIC
COMMUNITY
Start: 2021-03-11 | End: 2021-11-15 | Stop reason: ALTCHOICE

## 2021-05-11 RX ORDER — TRAVOPROST 0.004 %
DROPS OPHTHALMIC (EYE)
COMMUNITY
Start: 2021-02-05 | End: 2021-11-15 | Stop reason: ALTCHOICE

## 2021-05-11 RX ORDER — HYDROXYZINE PAMOATE 50 MG/1
CAPSULE ORAL
Qty: 180 CAPSULE | Refills: 1 | Status: SHIPPED | OUTPATIENT
Start: 2021-05-11 | End: 2021-11-15 | Stop reason: SDUPTHER

## 2021-05-11 RX ORDER — HYDROCHLOROTHIAZIDE 12.5 MG/1
12.5 CAPSULE, GELATIN COATED ORAL EVERY MORNING
Qty: 90 CAPSULE | Refills: 1 | Status: SHIPPED | OUTPATIENT
Start: 2021-05-11 | End: 2021-11-15 | Stop reason: SDUPTHER

## 2021-05-11 RX ORDER — SIMVASTATIN 40 MG
TABLET ORAL
Qty: 90 TABLET | Refills: 1 | Status: SHIPPED | OUTPATIENT
Start: 2021-05-11 | End: 2021-11-15 | Stop reason: SDUPTHER

## 2021-05-11 RX ORDER — NAPROXEN 500 MG/1
TABLET ORAL
Qty: 180 TABLET | Refills: 1 | Status: SHIPPED | OUTPATIENT
Start: 2021-05-11 | End: 2021-11-15 | Stop reason: SDUPTHER

## 2021-05-11 RX ORDER — OMEPRAZOLE 20 MG/1
20 CAPSULE, DELAYED RELEASE ORAL DAILY
Qty: 90 CAPSULE | Refills: 1 | Status: SHIPPED | OUTPATIENT
Start: 2021-05-11 | End: 2021-11-15 | Stop reason: SDUPTHER

## 2021-05-11 RX ORDER — AMOXICILLIN 875 MG/1
875 TABLET, COATED ORAL 2 TIMES DAILY
Qty: 14 TABLET | Refills: 0 | Status: SHIPPED | OUTPATIENT
Start: 2021-05-11 | End: 2021-05-18

## 2021-05-11 NOTE — PROGRESS NOTES
Bilateral     lasik    NASAL SEPTUM SURGERY Bilateral     OTHER SURGICAL HISTORY  2/23/15    exc. lt axillary mass    TONSILLECTOMY      TUBAL LIGATION         Social History     Tobacco Use    Smoking status: Current Every Day Smoker     Packs/day: 0.75     Years: 41.00     Pack years: 30.75    Smokeless tobacco: Never Used   Substance Use Topics    Alcohol use: No      Patient Active Problem List   Diagnosis    Recurrent major depressive disorder, in partial remission ([facility] [address]    Essential hypertension    Hypercholesterolemia    ELY (generalized anxiety disorder)    Jordan-Danlos syndrome    Temporomandibular joint-pain-dysfunction syndrome (TMJ)    Sacroiliac pain    Chronic generalized pain         Prior to Visit Medications    Medication Sig Taking?  Authorizing Provider   omeprazole (PRILOSEC) 20 MG delayed release capsule Take 1 capsule by mouth daily Yes Historical Provider, MD   pilocarpine (PILOCAR) 1 % ophthalmic solution instill 1 drop into left eye three times a day to four times a day Yes Lizz Shine MD   TRAVATAN Z 0.004 % SOLN ophthalmic solution instill 1 drop into both eyes every evening Yes Bird Webster   DULoxetine (CYMBALTA) 60 MG extended release capsule take 1 capsule by mouth twice a day Yes Tiffanie Estrada MD   hydrOXYzine (VISTARIL) 50 MG capsule take 1 capsule by mouth twice a day Yes Tiffanie Estrada MD   simvastatin (ZOCOR) 40 MG tablet take 1 tablet by mouth once daily Yes Tiffanie Estrada MD   hydroCHLOROthiazide (MICROZIDE) 12.5 MG capsule take 1 capsule by mouth every morning Yes CHRISSY Carpio CNP   atenolol (TENORMIN) 25 MG tablet take 1 tablet by mouth once daily Yes Tiffanie Estrada MD   naproxen (NAPROSYN) 500 MG tablet take 1 tablet by mouth twice a day with food Yes Tiffanie Estrada MD   tiZANidine (ZANAFLEX) 4 MG tablet Take 4 mg by mouth nightly Yes Historical Provider, MD     Review of Systems  Review of Systems   Constitutional: Negative for chills, diaphoresis, fatigue, fever and unexpected weight change. HENT: Positive for sinus pressure and sneezing. Negative for congestion, ear pain, hoarse voice and sore throat. Respiratory: Positive for cough (nonprocudtive nocturnal cough). Negative for choking, chest tightness, shortness of breath and wheezing. Cardiovascular: Negative for chest pain, palpitations and leg swelling. Gastrointestinal: Negative for abdominal pain, anal bleeding, blood in stool, constipation, diarrhea, nausea and vomiting. Endocrine: Negative. Musculoskeletal: Negative for joint swelling, myalgias and neck pain. Skin: Negative. Neurological: Positive for headaches. Negative for dizziness. Psychiatric/Behavioral: Negative for sleep disturbance. All other systems reviewed and are negative. Objective:       Physical Exam:  /80 (Site: Left Upper Arm, Position: Sitting, Cuff Size: Medium Adult)   Pulse 55   Temp 98.1 °F (36.7 °C) (Temporal)   Wt 150 lb 6.4 oz (68.2 kg)   SpO2 96%   BMI 26.64 kg/m²   Physical Exam  Vitals and nursing note reviewed. Constitutional:       General: She is not in acute distress. Appearance: She is well-developed. She is not ill-appearing. HENT:      Nose: Mucosal edema and rhinorrhea present. Rhinorrhea is purulent. Eyes:      General: Lids are normal. Vision grossly intact. Cardiovascular:      Rate and Rhythm: Normal rate and regular rhythm. Heart sounds: Normal heart sounds, S1 normal and S2 normal. No murmur heard. No friction rub. No gallop. Pulmonary:      Effort: Pulmonary effort is normal. No respiratory distress. Breath sounds: Normal breath sounds. No wheezing. Abdominal:      General: Bowel sounds are normal.      Palpations: Abdomen is soft. There is no mass. Tenderness: There is no abdominal tenderness. There is no guarding. Musculoskeletal:         General: Normal range of motion.    Skin:     General:

## 2021-05-16 ASSESSMENT — ENCOUNTER SYMPTOMS
ABDOMINAL PAIN: 0
SINUS PRESSURE: 1
HOARSE VOICE: 0
BLOOD IN STOOL: 0
WHEEZING: 0
COUGH: 1
NAUSEA: 0
CHEST TIGHTNESS: 0
SHORTNESS OF BREATH: 0
SORE THROAT: 0
ANAL BLEEDING: 0
CONSTIPATION: 0
DIARRHEA: 0
CHOKING: 0
SWOLLEN GLANDS: 0
VOMITING: 0

## 2021-05-16 ASSESSMENT — VISUAL ACUITY: OU: 1

## 2021-11-15 ENCOUNTER — OFFICE VISIT (OUTPATIENT)
Dept: FAMILY MEDICINE CLINIC | Age: 61
End: 2021-11-15
Payer: MEDICARE

## 2021-11-15 ENCOUNTER — HOSPITAL ENCOUNTER (OUTPATIENT)
Age: 61
Setting detail: SPECIMEN
Discharge: HOME | End: 2021-11-15

## 2021-11-15 VITALS
TEMPERATURE: 97.8 F | HEART RATE: 70 BPM | BODY MASS INDEX: 28.05 KG/M2 | DIASTOLIC BLOOD PRESSURE: 80 MMHG | WEIGHT: 152.4 LBS | HEIGHT: 62 IN | SYSTOLIC BLOOD PRESSURE: 122 MMHG | OXYGEN SATURATION: 95 %

## 2021-11-15 DIAGNOSIS — Z13.29 SCREENING FOR THYROID DISORDER: ICD-10-CM

## 2021-11-15 DIAGNOSIS — K21.9 GASTROESOPHAGEAL REFLUX DISEASE, UNSPECIFIED WHETHER ESOPHAGITIS PRESENT: ICD-10-CM

## 2021-11-15 DIAGNOSIS — G89.29 CHRONIC GENERALIZED PAIN: ICD-10-CM

## 2021-11-15 DIAGNOSIS — R52 CHRONIC GENERALIZED PAIN: ICD-10-CM

## 2021-11-15 DIAGNOSIS — I10 ESSENTIAL HYPERTENSION: ICD-10-CM

## 2021-11-15 DIAGNOSIS — Z63.4 BEREAVEMENT: ICD-10-CM

## 2021-11-15 DIAGNOSIS — G47.9 SLEEPING DIFFICULTY: ICD-10-CM

## 2021-11-15 DIAGNOSIS — Z13.0 SCREENING, ANEMIA, DEFICIENCY, IRON: ICD-10-CM

## 2021-11-15 DIAGNOSIS — F41.1 GAD (GENERALIZED ANXIETY DISORDER): Primary | ICD-10-CM

## 2021-11-15 DIAGNOSIS — E78.00 HYPERCHOLESTEROLEMIA: ICD-10-CM

## 2021-11-15 DIAGNOSIS — Z72.0 TOBACCO ABUSE: ICD-10-CM

## 2021-11-15 DIAGNOSIS — Z12.31 ENCOUNTER FOR SCREENING MAMMOGRAM FOR BREAST CANCER: ICD-10-CM

## 2021-11-15 LAB
ALBUMIN SERPL-MCNC: 4.1 G/DL (ref 3.5–5.2)
ALBUMIN/GLOBULIN RATIO: 2 (ref 1–2.5)
ALP BLD-CCNC: 104 U/L (ref 35–104)
ALT SERPL-CCNC: 13 U/L (ref 5–33)
ANION GAP SERPL CALCULATED.3IONS-SCNC: 9 MMOL/L (ref 9–17)
AST SERPL-CCNC: 15 U/L
BILIRUB SERPL-MCNC: 0.39 MG/DL (ref 0.3–1.2)
BUN BLDV-MCNC: 11 MG/DL (ref 8–23)
BUN/CREAT BLD: ABNORMAL (ref 9–20)
CALCIUM SERPL-MCNC: 9.5 MG/DL (ref 8.6–10.4)
CHLORIDE BLD-SCNC: 103 MMOL/L (ref 98–107)
CHOLESTEROL, FASTING: 183 MG/DL
CHOLESTEROL/HDL RATIO: 3.7
CO2: 30 MMOL/L (ref 20–31)
CREAT SERPL-MCNC: 0.66 MG/DL (ref 0.5–0.9)
GFR AFRICAN AMERICAN: >60 ML/MIN
GFR NON-AFRICAN AMERICAN: >60 ML/MIN
GFR SERPL CREATININE-BSD FRML MDRD: ABNORMAL ML/MIN/{1.73_M2}
GFR SERPL CREATININE-BSD FRML MDRD: ABNORMAL ML/MIN/{1.73_M2}
GLUCOSE BLD-MCNC: 93 MG/DL (ref 70–99)
HCT VFR BLD CALC: 42.7 % (ref 36.3–47.1)
HDLC SERPL-MCNC: 49 MG/DL
HEMOGLOBIN: 13.4 G/DL (ref 11.9–15.1)
LDL CHOLESTEROL: 107 MG/DL (ref 0–130)
MCH RBC QN AUTO: 29.4 PG (ref 25.2–33.5)
MCHC RBC AUTO-ENTMCNC: 31.4 G/DL (ref 28.4–34.8)
MCV RBC AUTO: 93.6 FL (ref 82.6–102.9)
NRBC AUTOMATED: 0 PER 100 WBC
PDW BLD-RTO: 14.4 % (ref 11.8–14.4)
PLATELET # BLD: 210 K/UL (ref 138–453)
PMV BLD AUTO: 12.1 FL (ref 8.1–13.5)
POTASSIUM SERPL-SCNC: 4.7 MMOL/L (ref 3.7–5.3)
RBC # BLD: 4.56 M/UL (ref 3.95–5.11)
SODIUM BLD-SCNC: 142 MMOL/L (ref 135–144)
TOTAL PROTEIN: 6.2 G/DL (ref 6.4–8.3)
TRIGLYCERIDE, FASTING: 134 MG/DL
TSH SERPL DL<=0.05 MIU/L-ACNC: 1.89 MIU/L (ref 0.3–5)
VLDLC SERPL CALC-MCNC: NORMAL MG/DL (ref 1–30)
WBC # BLD: 6.2 K/UL (ref 3.5–11.3)

## 2021-11-15 PROCEDURE — 4004F PT TOBACCO SCREEN RCVD TLK: CPT | Performed by: INTERNAL MEDICINE

## 2021-11-15 PROCEDURE — G8427 DOCREV CUR MEDS BY ELIG CLIN: HCPCS | Performed by: INTERNAL MEDICINE

## 2021-11-15 PROCEDURE — 3017F COLORECTAL CA SCREEN DOC REV: CPT | Performed by: INTERNAL MEDICINE

## 2021-11-15 PROCEDURE — 99214 OFFICE O/P EST MOD 30 MIN: CPT | Performed by: INTERNAL MEDICINE

## 2021-11-15 PROCEDURE — G8484 FLU IMMUNIZE NO ADMIN: HCPCS | Performed by: INTERNAL MEDICINE

## 2021-11-15 PROCEDURE — G8417 CALC BMI ABV UP PARAM F/U: HCPCS | Performed by: INTERNAL MEDICINE

## 2021-11-15 RX ORDER — DULOXETIN HYDROCHLORIDE 60 MG/1
CAPSULE, DELAYED RELEASE ORAL
Qty: 180 CAPSULE | Refills: 1 | Status: SHIPPED | OUTPATIENT
Start: 2021-11-15

## 2021-11-15 RX ORDER — HYDROCHLOROTHIAZIDE 12.5 MG/1
12.5 CAPSULE, GELATIN COATED ORAL EVERY MORNING
Qty: 90 CAPSULE | Refills: 1 | Status: SHIPPED | OUTPATIENT
Start: 2021-11-15 | End: 2022-06-13

## 2021-11-15 RX ORDER — NAPROXEN 500 MG/1
TABLET ORAL
Qty: 180 TABLET | Refills: 1 | Status: SHIPPED | OUTPATIENT
Start: 2021-11-15

## 2021-11-15 RX ORDER — ATENOLOL 25 MG/1
TABLET ORAL
Qty: 90 TABLET | Refills: 1 | Status: SHIPPED | OUTPATIENT
Start: 2021-11-15 | End: 2022-05-17

## 2021-11-15 RX ORDER — LATANOPROST 50 UG/ML
SOLUTION/ DROPS OPHTHALMIC
COMMUNITY
Start: 2021-08-10

## 2021-11-15 RX ORDER — OMEPRAZOLE 20 MG/1
20 CAPSULE, DELAYED RELEASE ORAL DAILY
Qty: 90 CAPSULE | Refills: 1 | Status: SHIPPED | OUTPATIENT
Start: 2021-11-15

## 2021-11-15 RX ORDER — AMITRIPTYLINE HYDROCHLORIDE 10 MG/1
10 TABLET, FILM COATED ORAL NIGHTLY
Qty: 30 TABLET | Refills: 0 | Status: SHIPPED | OUTPATIENT
Start: 2021-11-15 | End: 2022-01-14

## 2021-11-15 RX ORDER — HYDROXYZINE PAMOATE 50 MG/1
CAPSULE ORAL
Qty: 180 CAPSULE | Refills: 1 | Status: SHIPPED | OUTPATIENT
Start: 2021-11-15 | End: 2022-06-13

## 2021-11-15 RX ORDER — SIMVASTATIN 40 MG
TABLET ORAL
Qty: 90 TABLET | Refills: 1 | Status: SHIPPED | OUTPATIENT
Start: 2021-11-15 | End: 2022-07-14

## 2021-11-15 SDOH — ECONOMIC STABILITY: FOOD INSECURITY: WITHIN THE PAST 12 MONTHS, YOU WORRIED THAT YOUR FOOD WOULD RUN OUT BEFORE YOU GOT MONEY TO BUY MORE.: NEVER TRUE

## 2021-11-15 SDOH — SOCIAL STABILITY - SOCIAL INSECURITY: DISSAPEARANCE AND DEATH OF FAMILY MEMBER: Z63.4

## 2021-11-15 SDOH — ECONOMIC STABILITY: FOOD INSECURITY: WITHIN THE PAST 12 MONTHS, THE FOOD YOU BOUGHT JUST DIDN'T LAST AND YOU DIDN'T HAVE MONEY TO GET MORE.: NEVER TRUE

## 2021-11-15 ASSESSMENT — SOCIAL DETERMINANTS OF HEALTH (SDOH): HOW HARD IS IT FOR YOU TO PAY FOR THE VERY BASICS LIKE FOOD, HOUSING, MEDICAL CARE, AND HEATING?: NOT VERY HARD

## 2021-11-15 NOTE — PROGRESS NOTES
Subjective:       Patient ID:     Romeo Ramos is a 64 y.o. female who presents for   Chief Complaint   Patient presents with    Hypertension    Diabetes    Fall       HPI:  Nursing note reviewed and discussed with patient. HPI  Catherine Serge backwards and hit her head on the cement when going to her father's  - [place]. No LOC, no vision changes. Was a little dizzy then. Was climbing into her brother's truck when her legs gave out. Lower back has been aching more than normal.   Her mother is doing better now and that is helping her mental health. Taking care of her mother most days   Hypertension-tolerating current regimen without chest pain, palpitations, dizziness, peripheral edema, dyspnea on exertion, orthopnea, paroxysmal nocturnal dyspnea. Hyperlipidemia-tolerating current regimen without myalgias, dyspepsia, jaundice. Mostly compliant with diet recommendations for low salt diet, tries to limit greasy/cheesy/fried foods, not very compliant with exercise recommendations. Cardiovascular risk factors: dyslipidemia, hypertension, sedentary lifestyle and smoking/ tobacco exposure   Smoking about a pack per day now since her father passed   Depression - doing well on cymbalta - worse since her father passed away. Denies anhedonia, nervousness, sleep difficulty, racing thoughts, auditory or visual hallucination, thoughts of self harm, suicidal or homicidal ideation. She is able to mourn her father and she likes the way the cymbalta is allowing her to grieve. Joint pains getting worse since weather changed to cold weather, not sure the naproxen is helping. Patient's medications, allergies, past medical, surgical, social and family histories were reviewed and updated as appropriate.     Past Medical History:   Diagnosis Date    Anxiety and depression     Arthritis     Chronic back pain     Degenerative joint disease     Diarrhea     Hyperlipidemia     Osteopenia      Past Surgical History:   Procedure Laterality Date    CERVICAL FUSION       SECTION      x 2    COLONOSCOPY      CYST REMOVAL Left     second finger    EYE SURGERY Bilateral     lasik    NASAL SEPTUM SURGERY Bilateral     OTHER SURGICAL HISTORY  2/23/15    exc. lt axillary mass    TONSILLECTOMY      TUBAL LIGATION         Social History     Tobacco Use    Smoking status: Current Every Day Smoker     Packs/day: 0.75     Years: 41.00     Pack years: 30.75    Smokeless tobacco: Never Used   Substance Use Topics    Alcohol use: No      Patient Active Problem List   Diagnosis    Recurrent major depressive disorder, in partial remission ([facility] [address]    Essential hypertension    Hypercholesterolemia    ELY (generalized anxiety disorder)    Jordan-Danlos syndrome    Temporomandibular joint-pain-dysfunction syndrome (TMJ)    Sacroiliac pain    Chronic generalized pain         Prior to Visit Medications    Medication Sig Taking?  Authorizing Provider   latanoprost (XALATAN) 0.005 % ophthalmic solution place 1 drop into right eye at bedtime Yes Jeremy Zabala MD   omeprazole (PRILOSEC) 20 MG delayed release capsule Take 1 capsule by mouth daily Yes Tina Vicente MD   atenolol (TENORMIN) 25 MG tablet take 1 tablet by mouth once daily Yes Tiffanie Davies MD   hydroCHLOROthiazide (MICROZIDE) 12.5 MG capsule Take 1 capsule by mouth every morning Yes Tiffanie Davies MD   simvastatin (ZOCOR) 40 MG tablet take 1 tablet by mouth once daily Yes Tiffanie Davies MD   hydrOXYzine (VISTARIL) 50 MG capsule Take 1 capsule by mouth twice a day Yes Tiffanie Davies MD   naproxen (NAPROSYN) 500 MG tablet take 1 tablet by mouth twice a day with food Yes Tiffanie Davise MD   DULoxetine (CYMBALTA) 60 MG extended release capsule take 1 capsule by mouth twice a day Yes Tiffanie Davies MD   tiZANidine (ZANAFLEX) 4 MG tablet Take 4 mg by mouth nightly Yes Historical Provider, MD     Review of Systems  Review of Systems Constitutional: Negative for fatigue, fever and unexpected weight change. Respiratory: Negative for cough, choking, chest tightness, shortness of breath and wheezing. Cardiovascular: Negative for chest pain, palpitations and leg swelling. Gastrointestinal: Negative for abdominal pain, anal bleeding, blood in stool, constipation, diarrhea, nausea and vomiting. Endocrine: Negative. Musculoskeletal: Negative for joint swelling and myalgias. Skin: Negative. Neurological: Negative for dizziness. Psychiatric/Behavioral: Negative for sleep disturbance. All other systems reviewed and are negative. Objective:       Physical Exam:  /80 (Site: Right Upper Arm, Position: Sitting, Cuff Size: Medium Adult)   Pulse 70   Temp 97.8 °F (36.6 °C) (Temporal)   Ht 5' 2\" (1.575 m)   Wt 152 lb 6.4 oz (69.1 kg)   SpO2 95%   BMI 27.87 kg/m²   Physical Exam  Vitals and nursing note reviewed. Constitutional:       General: She is not in acute distress. Appearance: She is well-developed. She is not ill-appearing. Eyes:      General: Lids are normal. Vision grossly intact. Cardiovascular:      Rate and Rhythm: Normal rate and regular rhythm. Heart sounds: Normal heart sounds, S1 normal and S2 normal. No murmur heard. No friction rub. No gallop. Pulmonary:      Effort: Pulmonary effort is normal. No respiratory distress. Breath sounds: Normal breath sounds. No wheezing. Abdominal:      General: Bowel sounds are normal.      Palpations: Abdomen is soft. There is no mass. Tenderness: There is no abdominal tenderness. There is no guarding. Musculoskeletal:         General: Normal range of motion. Skin:     General: Skin is warm and dry. Capillary Refill: Capillary refill takes less than 2 seconds. Neurological:      General: No focal deficit present. Mental Status: She is alert and oriented to person, place, and time.          Data Review  not applicable Assessment/Plan:      1. ELY (generalized anxiety disorder)  - hydrOXYzine (VISTARIL) 50 MG capsule; Take 1 capsule by mouth twice a day  Dispense: 180 capsule; Refill: 1  - DULoxetine (CYMBALTA) 60 MG extended release capsule; take 1 capsule by mouth twice a day  Dispense: 180 capsule; Refill: 1    2. Essential hypertension  - atenolol (TENORMIN) 25 MG tablet; take 1 tablet by mouth once daily  Dispense: 90 tablet; Refill: 1  - hydroCHLOROthiazide (MICROZIDE) 12.5 MG capsule; Take 1 capsule by mouth every morning  Dispense: 90 capsule; Refill: 1  - Comprehensive Metabolic Panel; Future    3. Chronic generalized pain  - naproxen (NAPROSYN) 500 MG tablet; take 1 tablet by mouth twice a day with food  Dispense: 180 tablet; Refill: 1    4. Gastroesophageal reflux disease, unspecified whether esophagitis present  - omeprazole (PRILOSEC) 20 MG delayed release capsule; Take 1 capsule by mouth daily  Dispense: 90 capsule; Refill: 1    5. Hypercholesterolemia  - Lipid, Fasting; Future  - simvastatin (ZOCOR) 40 MG tablet; take 1 tablet by mouth once daily  Dispense: 90 tablet; Refill: 1  - Comprehensive Metabolic Panel; Future    6. Bereavement  Doing well - declines intervention  Normal course of grief reviewed     7. Sleeping difficulty  - amitriptyline (ELAVIL) 10 MG tablet; Take 1 tablet by mouth nightly  Dispense: 30 tablet; Refill: 0    8. Screening for thyroid disorder  - TSH with Reflex; Future    9. Screening, anemia, deficiency, iron  - CBC; Future    10. Encounter for screening mammogram for breast cancer  Declines today     11.  Tobacco abuse  Not ready to quit at this time            Health Maintenance Due   Topic Date Due    DTaP/Tdap/Td vaccine (1 - Tdap) Never done    Cervical cancer screen  Never done    Breast cancer screen  Never done    Shingles Vaccine (1 of 2) Never done    A1C test (Diabetic or Prediabetic)  10/07/2021    Lipid screen  10/07/2021    Potassium monitoring  10/07/2021    Creatinine monitoring  11/11/2021    COVID-19 Vaccine (2 - Pfizer 3-dose booster series) 11/18/2021       Electronically signed by Cate Gordon MD on 11/15/2021 at 2:32 PM

## 2021-11-15 NOTE — PROGRESS NOTES
Pt had a fall on 9/27/2021.  She hit back head and lower back is hurting a little bit more than normal  She has declined Influenza and PPSV23 at this time

## 2021-11-17 ASSESSMENT — ENCOUNTER SYMPTOMS
ABDOMINAL PAIN: 0
CONSTIPATION: 0
COUGH: 0
NAUSEA: 0
CHOKING: 0
BLOOD IN STOOL: 0
CHEST TIGHTNESS: 0
VOMITING: 0
ANAL BLEEDING: 0
SHORTNESS OF BREATH: 0
DIARRHEA: 0
WHEEZING: 0

## 2021-11-17 ASSESSMENT — VISUAL ACUITY: OU: 1

## 2022-01-14 DIAGNOSIS — G47.9 SLEEPING DIFFICULTY: ICD-10-CM

## 2022-01-14 RX ORDER — AMITRIPTYLINE HYDROCHLORIDE 10 MG/1
TABLET, FILM COATED ORAL
Qty: 30 TABLET | Refills: 0 | Status: SHIPPED | OUTPATIENT
Start: 2022-01-14 | End: 2022-03-07

## 2022-01-14 NOTE — TELEPHONE ENCOUNTER
Last visit: 11/15/2021  Last Med refill: 11/15/2021  Does patient have enough medication for 72 hours: No:     Next Visit Date:  Future Appointments   Date Time Provider Willis Yadira   5/16/2022  2:00 PM Tiffanie Yao Do,  [address] [facility]   Topic Date Due    Depression Monitoring  Never done    DTaP/Tdap/Td vaccine (1 - Tdap) Never done    Cervical cancer screen  Never done    Breast cancer screen  Never done    Shingles Vaccine (1 of 2) Never done    A1C test (Diabetic or Prediabetic)  10/07/2021    COVID-19 Vaccine (2 - Pfizer 3-dose series) 11/18/2021    Flu vaccine (1) 11/15/2022 (Originally 9/1/2021)    Pneumococcal 0-64 years Vaccine (1 of 2 - PPSV23) 11/15/2024 (Originally 3/24/1966)    Lipid screen  11/15/2022    Potassium monitoring  11/15/2022    Creatinine monitoring  11/15/2022    Colon cancer screen colonoscopy  11/06/2023    Hepatitis C screen  Completed    HIV screen  Completed    Hepatitis A vaccine  Aged Out    Hepatitis B vaccine  Aged Out    Hib vaccine  Aged Out    Meningococcal (ACWY) vaccine  Aged Out    Low dose CT lung screening  Discontinued       Hemoglobin A1C (%)   Date Value   10/07/2020 5.7   05/01/2019 5.7             ( goal A1C is < 7)   No results found for: LABMICR  LDL Cholesterol (mg/dL)   Date Value   11/15/2021 107   10/07/2020 86       (goal LDL is <100)   AST (U/L)   Date Value   11/15/2021 15     ALT (U/L)   Date Value   11/15/2021 13     BUN (mg/dL)   Date Value   11/15/2021 11     BP Readings from Last 3 Encounters:   11/15/21 122/80   05/11/21 130/80   10/07/20 124/76          (goal 120/80)    All Future Testing planned in [facility]              Patient Active Problem List:     Recurrent major depressive disorder, in partial remission (HCC)     Essential hypertension     Hypercholesterolemia     ELY (generalized anxiety disorder)     Jordan-Danlos syndrome     Temporomandibular joint-pain-dysfunction syndrome (TMJ) Sacroiliac pain     Chronic generalized pain     Tobacco abuse

## 2022-03-05 DIAGNOSIS — G47.9 SLEEPING DIFFICULTY: ICD-10-CM

## 2022-03-07 RX ORDER — AMITRIPTYLINE HYDROCHLORIDE 10 MG/1
TABLET, FILM COATED ORAL
Qty: 30 TABLET | Refills: 0 | Status: SHIPPED | OUTPATIENT
Start: 2022-03-07 | End: 2022-04-14

## 2022-03-07 NOTE — TELEPHONE ENCOUNTER
Last visit: 11/15/2021   Last Med refill: 01/14/2022  Does patient have enough medication for 72 hours: No:     Next Visit Date:  Future Appointments   Date Time Provider Willis May   5/16/2022  2:00 PM Tiffanie Barakat  [address] Maintenance   Topic Date Due    Depression Monitoring  Never done    DTaP/Tdap/Td vaccine (1 - Tdap) Never done    Cervical cancer screen  Never done    Breast cancer screen  Never done    Shingles Vaccine (1 of 2) Never done    A1C test (Diabetic or Prediabetic)  10/07/2021    COVID-19 Vaccine (2 - Pfizer 3-dose series) 11/18/2021    Flu vaccine (1) 11/15/2022 (Originally 9/1/2021)    Pneumococcal 0-64 years Vaccine (1 of 2 - PPSV23) 11/15/2024 (Originally 3/24/1966)    Lipid screen  11/15/2022    Potassium monitoring  11/15/2022    Creatinine monitoring  11/15/2022    Colorectal Cancer Screen  11/06/2023    Hepatitis C screen  Completed    HIV screen  Completed    Hepatitis A vaccine  Aged Out    Hepatitis B vaccine  Aged Out    Hib vaccine  Aged Out    Meningococcal (ACWY) vaccine  Aged Out    Low dose CT lung screening  Discontinued       Hemoglobin A1C (%)   Date Value   10/07/2020 5.7   05/01/2019 5.7             ( goal A1C is < 7)   No results found for: LABMICR  LDL Cholesterol (mg/dL)   Date Value   11/15/2021 107   10/07/2020 86       (goal LDL is <100)   AST (U/L)   Date Value   11/15/2021 15     ALT (U/L)   Date Value   11/15/2021 13     BUN (mg/dL)   Date Value   11/15/2021 11     BP Readings from Last 3 Encounters:   11/15/21 122/80   05/11/21 130/80   10/07/20 124/76          (goal 120/80)    All Future Testing planned in [facility]              Patient Active Problem List:     Recurrent major depressive disorder, in partial remission (HCC)     Essential hypertension     Hypercholesterolemia     ELY (generalized anxiety disorder)     Jordan-Danlos syndrome     Temporomandibular joint-pain-dysfunction syndrome (TMJ) Sacroiliac pain     Chronic generalized pain     Tobacco abuse

## 2022-04-14 DIAGNOSIS — G47.9 SLEEPING DIFFICULTY: ICD-10-CM

## 2022-04-14 RX ORDER — AMITRIPTYLINE HYDROCHLORIDE 10 MG/1
TABLET, FILM COATED ORAL
Qty: 30 TABLET | Refills: 0 | Status: SHIPPED | OUTPATIENT
Start: 2022-04-14 | End: 2022-05-16

## 2022-04-14 NOTE — TELEPHONE ENCOUNTER
Last visit: 11/15/21  Last Med refill: 3/7/22  Does patient have enough medication for 72 hours: No:     Next Visit Date:  Future Appointments   Date Time Provider Willis May   5/16/2022  2:00 PM Tiffanie Walker  [address] Maintenance   Topic Date Due    Depression Monitoring  Never done    DTaP/Tdap/Td vaccine (1 - Tdap) Never done    Cervical cancer screen  Never done    Breast cancer screen  Never done    Shingles Vaccine (1 of 2) Never done    A1C test (Diabetic or Prediabetic)  10/07/2021    COVID-19 Vaccine (2 - Pfizer 3-dose series) 11/18/2021    Flu vaccine (Season Ended) 11/15/2022 (Originally 9/1/2022)    Pneumococcal 0-64 years Vaccine (1 - PCV) 11/15/2024 (Originally 3/24/1966)    Lipid screen  11/15/2022    Potassium monitoring  11/15/2022    Creatinine monitoring  11/15/2022    Colorectal Cancer Screen  11/06/2023    Hepatitis C screen  Completed    HIV screen  Completed    Hepatitis A vaccine  Aged Out    Hepatitis B vaccine  Aged Out    Hib vaccine  Aged Out    Meningococcal (ACWY) vaccine  Aged Out    Low dose CT lung screening  Discontinued       Hemoglobin A1C (%)   Date Value   10/07/2020 5.7   05/01/2019 5.7             ( goal A1C is < 7)   No results found for: LABMICR  LDL Cholesterol (mg/dL)   Date Value   11/15/2021 107   10/07/2020 86       (goal LDL is <100)   AST (U/L)   Date Value   11/15/2021 15     ALT (U/L)   Date Value   11/15/2021 13     BUN (mg/dL)   Date Value   11/15/2021 11     BP Readings from Last 3 Encounters:   11/15/21 122/80   05/11/21 130/80   10/07/20 124/76          (goal 120/80)    All Future Testing planned in [facility]              Patient Active Problem List:     Recurrent major depressive disorder, in partial remission (HCC)     Essential hypertension     Hypercholesterolemia     ELY (generalized anxiety disorder)     Jordan-Danlos syndrome     Temporomandibular joint-pain-dysfunction syndrome (TMJ) Sacroiliac pain     Chronic generalized pain     Tobacco abuse

## 2022-05-16 ENCOUNTER — OFFICE VISIT (OUTPATIENT)
Dept: FAMILY MEDICINE CLINIC | Age: 62
End: 2022-05-16
Payer: MEDICARE

## 2022-05-16 VITALS
TEMPERATURE: 98.5 F | OXYGEN SATURATION: 97 % | DIASTOLIC BLOOD PRESSURE: 70 MMHG | BODY MASS INDEX: 28.72 KG/M2 | HEART RATE: 68 BPM | WEIGHT: 157 LBS | SYSTOLIC BLOOD PRESSURE: 116 MMHG

## 2022-05-16 DIAGNOSIS — M54.50 ACUTE MIDLINE LOW BACK PAIN WITHOUT SCIATICA: Primary | ICD-10-CM

## 2022-05-16 DIAGNOSIS — E78.00 HYPERCHOLESTEROLEMIA: ICD-10-CM

## 2022-05-16 DIAGNOSIS — G47.9 SLEEPING DIFFICULTY: ICD-10-CM

## 2022-05-16 DIAGNOSIS — F41.1 GAD (GENERALIZED ANXIETY DISORDER): ICD-10-CM

## 2022-05-16 DIAGNOSIS — I10 ESSENTIAL HYPERTENSION: ICD-10-CM

## 2022-05-16 PROCEDURE — G8417 CALC BMI ABV UP PARAM F/U: HCPCS | Performed by: INTERNAL MEDICINE

## 2022-05-16 PROCEDURE — G8427 DOCREV CUR MEDS BY ELIG CLIN: HCPCS | Performed by: INTERNAL MEDICINE

## 2022-05-16 PROCEDURE — 99213 OFFICE O/P EST LOW 20 MIN: CPT | Performed by: INTERNAL MEDICINE

## 2022-05-16 PROCEDURE — 3017F COLORECTAL CA SCREEN DOC REV: CPT | Performed by: INTERNAL MEDICINE

## 2022-05-16 PROCEDURE — 4004F PT TOBACCO SCREEN RCVD TLK: CPT | Performed by: INTERNAL MEDICINE

## 2022-05-16 RX ORDER — KETOROLAC TROMETHAMINE 30 MG/ML
30 INJECTION, SOLUTION INTRAMUSCULAR; INTRAVENOUS ONCE
Status: COMPLETED | OUTPATIENT
Start: 2022-05-16 | End: 2022-05-16

## 2022-05-16 RX ORDER — METHYLPREDNISOLONE 4 MG/1
TABLET ORAL
Qty: 1 KIT | Refills: 0 | Status: SHIPPED | OUTPATIENT
Start: 2022-05-16

## 2022-05-16 RX ORDER — CYCLOBENZAPRINE HCL 10 MG
TABLET ORAL PRN
COMMUNITY
Start: 2022-05-13

## 2022-05-16 RX ORDER — BACLOFEN 10 MG/1
10 TABLET ORAL 3 TIMES DAILY
Qty: 21 TABLET | Refills: 0 | Status: SHIPPED | OUTPATIENT
Start: 2022-05-16

## 2022-05-16 RX ORDER — AMITRIPTYLINE HYDROCHLORIDE 50 MG/1
50 TABLET, FILM COATED ORAL NIGHTLY
Qty: 30 TABLET | Refills: 3 | Status: SHIPPED | OUTPATIENT
Start: 2022-05-16

## 2022-05-16 RX ADMIN — KETOROLAC TROMETHAMINE 30 MG: 30 INJECTION, SOLUTION INTRAMUSCULAR; INTRAVENOUS at 14:48

## 2022-05-16 ASSESSMENT — PATIENT HEALTH QUESTIONNAIRE - PHQ9
1. LITTLE INTEREST OR PLEASURE IN DOING THINGS: 1
SUM OF ALL RESPONSES TO PHQ9 QUESTIONS 1 & 2: 2
6. FEELING BAD ABOUT YOURSELF - OR THAT YOU ARE A FAILURE OR HAVE LET YOURSELF OR YOUR FAMILY DOWN: 0
9. THOUGHTS THAT YOU WOULD BE BETTER OFF DEAD, OR OF HURTING YOURSELF: 0
SUM OF ALL RESPONSES TO PHQ QUESTIONS 1-9: 6
8. MOVING OR SPEAKING SO SLOWLY THAT OTHER PEOPLE COULD HAVE NOTICED. OR THE OPPOSITE, BEING SO FIGETY OR RESTLESS THAT YOU HAVE BEEN MOVING AROUND A LOT MORE THAN USUAL: 0
2. FEELING DOWN, DEPRESSED OR HOPELESS: 1
5. POOR APPETITE OR OVEREATING: 0
10. IF YOU CHECKED OFF ANY PROBLEMS, HOW DIFFICULT HAVE THESE PROBLEMS MADE IT FOR YOU TO DO YOUR WORK, TAKE CARE OF THINGS AT HOME, OR GET ALONG WITH OTHER PEOPLE: 0
SUM OF ALL RESPONSES TO PHQ QUESTIONS 1-9: 6
7. TROUBLE CONCENTRATING ON THINGS, SUCH AS READING THE NEWSPAPER OR WATCHING TELEVISION: 0
3. TROUBLE FALLING OR STAYING ASLEEP: 3
SUM OF ALL RESPONSES TO PHQ QUESTIONS 1-9: 6
SUM OF ALL RESPONSES TO PHQ QUESTIONS 1-9: 6
4. FEELING TIRED OR HAVING LITTLE ENERGY: 1

## 2022-05-16 ASSESSMENT — ENCOUNTER SYMPTOMS
ANAL BLEEDING: 0
CHEST TIGHTNESS: 0
VOMITING: 0
SHORTNESS OF BREATH: 0
BACK PAIN: 1
COUGH: 0
NAUSEA: 0
DIARRHEA: 0
WHEEZING: 0
BLOOD IN STOOL: 0
CHOKING: 0
ABDOMINAL PAIN: 0
CONSTIPATION: 0
BOWEL INCONTINENCE: 0

## 2022-05-16 ASSESSMENT — VISUAL ACUITY: OU: 1

## 2022-05-16 NOTE — PROGRESS NOTES
Pt is here for a 6 month follow up  She is asking questions about PVC 20  She is having bad pain in lower back  Patient has declined mammogram

## 2022-05-16 NOTE — PATIENT INSTRUCTIONS
Patient Education        Stopping Smoking: Care Instructions  Your Care Instructions     Cigarette smokers crave the nicotine in cigarettes. Giving it up is much harder than simply changing a habit. Your body has to stop craving the nicotine. It is hard to quit, but you can do it. There are many tools that people use to quitsmoking. You may find that combining tools works best for you. There are several steps to quitting. First you get ready to quit. Then you get support to help you. After that, you learn new skills and behaviors to become anonsmoker. For many people, a necessary step is getting and using medicine. Your doctor will help you set up the plan that best meets your needs. You may want to attend a smoking cessation program to help you quit smoking. When you choose a program, look for one that has proven success. Ask your doctor for ideas. You will greatly increase your chances of success if you take medicineas well as get counseling or join a cessation program.  Some of the changes you feel when you first quit tobacco are uncomfortable. Your body will miss the nicotine at first, and you may feel short-tempered and grumpy. You may have trouble sleeping or concentrating. Medicine can help you deal with these symptoms. You may struggle with changing your smoking habits and rituals. The last step is the tricky one: Be prepared for the smoking urge to continue for a time. This is a lot to deal with, but keep at it. You willfeel better. Follow-up care is a key part of your treatment and safety. Be sure to make and go to all appointments, and call your doctor if you are having problems. It's also a good idea to know your test results and keep alist of the medicines you take. How can you care for yourself at home?  Ask your family, friends, and coworkers for support. You have a better chance of quitting if you have help and support.    Join a support group, such as Nicotine Anonymous, for people who are trying to quit smoking.  Consider signing up for a smoking cessation program, such as the American Lung Association's Freedom from Smoking program.   Get text messaging support. Go to the website at www.smokefree. gov to sign up for the [facility] program.   Set a quit date. Pick your date carefully so that it is not right in the middle of a big deadline or stressful time. Once you quit, do not even take a puff. Get rid of all ashtrays and lighters after your last cigarette. Clean your house and your clothes so that they do not smell of smoke.  Learn how to be a nonsmoker. Think about ways you can avoid those things that make you reach for a cigarette. ? Avoid situations that put you at greatest risk for smoking. For some people, it is hard to have a drink with friends without smoking. For others, they might skip a coffee break with coworkers who smoke. ? Change your daily routine. Take a different route to work or eat a meal in a different place.  Cut down on stress. Calm yourself or release tension by doing an activity you enjoy, such as reading a book, taking a hot bath, or gardening.  Talk to your doctor or pharmacist about nicotine replacement therapy, which replaces the nicotine in your body. You still get nicotine but you do not use tobacco. Nicotine replacement products help you slowly reduce the amount of nicotine you need. These products come in several forms, many of them available over-the-counter:  ? Nicotine patches  ? Nicotine gum and lozenges  ? Nicotine inhaler   Ask your doctor about bupropion (Wellbutrin) or varenicline (Chantix), which are prescription medicines. They do not contain nicotine. They help you by reducing withdrawal symptoms, such as stress and anxiety.  Some people find hypnosis, acupuncture, and massage helpful for ending the smoking habit.  Eat a healthy diet and get regular exercise.  Having healthy habits will help your body move past its craving for nicotine.  Be prepared to keep trying. Most people are not successful the first few times they try to quit. Do not get mad at yourself if you smoke again. Make a list of things you learned and think about when you want to try again, such as next week, next month, or next year. Where can you learn more? Go to https://'[employer]elewshila[employer]. org and sign in to your [employer] account. Enter Q181 in the [employer] box to learn more about \"Stopping Smoking: Care Instructions. \"     If you do not have an account, please click on the \"Sign Up Now\" link. Current as of: October 28, 2021               Content Version: 13.2  © 2006-2022 Healthwise, Incorporated. Care instructions adapted under license by [facility] ([facility]). If you have questions about a medical condition or this instruction, always ask your healthcare professional. [address] any warranty or liability for your use of this information.

## 2022-05-16 NOTE — PROGRESS NOTES
Subjective:       Patient ID:     Franc Farrell is a 58 y.o. female who presents for   Chief Complaint   Patient presents with    Hypertension    Back Pain       HPI:  Nursing note reviewed and discussed with patient. Hypertension-tolerating current regimen without chest pain, palpitations, dizziness, peripheral edema, dyspnea on exertion, orthopnea, paroxysmal nocturnal dyspnea. Hyperlipidemia-tolerating current regimen without myalgias, dyspepsia, jaundice. Mostly compliant with diet recommendations for low salt diet, tries to limit greasy/cheesy/fried foods, not very compliant with exercise recommendations. Cardiovascular risk factors: hypertension, sedentary lifestyle and smoking/ tobacco exposure    Back Pain  This is a chronic problem. The current episode started more than 1 year ago. The problem occurs constantly. Progression since onset: was stable, got worse about four days ago  The pain is present in the lumbar spine. The quality of the pain is described as aching. The pain does not radiate. The pain is moderate. The symptoms are aggravated by sitting and standing (worse when she first stands up, legs feel weak, settles after a couple minutes of standing). Stiffness is present all day. Pertinent negatives include no abdominal pain, bladder incontinence, bowel incontinence, chest pain, dysuria, fever, headaches, leg pain, numbness, paresis, paresthesias, pelvic pain, perianal numbness, tingling, weakness or weight loss. Risk factors include menopause. She has tried muscle relaxant and analgesics for the symptoms. Patient's medications, allergies, past medical, surgical, social and family histories were reviewed and updated as appropriate.     Past Medical History:   Diagnosis Date    Anxiety and depression     Arthritis     Chronic back pain     Degenerative joint disease     Diarrhea     Hyperlipidemia     Osteopenia      Past Surgical History:   Procedure Laterality Date    CERVICAL FUSION       SECTION      x 2    COLONOSCOPY      CYST REMOVAL Left     second finger    EYE SURGERY Bilateral     lasik    NASAL SEPTUM SURGERY Bilateral     OTHER SURGICAL HISTORY  2/23/15    exc. lt axillary mass    TONSILLECTOMY      TUBAL LIGATION         Social History     Tobacco Use    Smoking status: Current Every Day Smoker     Packs/day: 0.75     Years: 41.00     Pack years: 30.75    Smokeless tobacco: Never Used   Substance Use Topics    Alcohol use: No      Patient Active Problem List   Diagnosis    Recurrent major depressive disorder, in partial remission ([facility] [address]    Essential hypertension    Hypercholesterolemia    ELY (generalized anxiety disorder)    Jordan-Danlos syndrome    Temporomandibular joint-pain-dysfunction syndrome (TMJ)    Sacroiliac pain    Chronic generalized pain    Tobacco abuse         Prior to Visit Medications    Medication Sig Taking?  Authorizing Provider   amitriptyline (ELAVIL) 10 MG tablet take 1 tablet by mouth at bedtime Yes Tiffanie Tiwari MD   hydrOXYzine (VISTARIL) 50 MG capsule Take 1 capsule by mouth twice a day Yes Tiffanie Tiwari MD   atenolol (TENORMIN) 25 MG tablet take 1 tablet by mouth once daily Yes Tiffanie Tiwari MD   DULoxetine (CYMBALTA) 60 MG extended release capsule take 1 capsule by mouth twice a day Yes Tiffanie Tiwari MD   naproxen (NAPROSYN) 500 MG tablet take 1 tablet by mouth twice a day with food Yes Tiffanie Tiwari MD   omeprazole (PRILOSEC) 20 MG delayed release capsule Take 1 capsule by mouth daily Yes Tiffanie Tiwari MD   hydroCHLOROthiazide (MICROZIDE) 12.5 MG capsule Take 1 capsule by mouth every morning Yes Tiffanie Tiwari MD   simvastatin (ZOCOR) 40 MG tablet take 1 tablet by mouth once daily Yes Tiffanie Tiwari MD   cyclobenzaprine (FLEXERIL) 10 MG tablet Take by mouth as needed  CHRISSY Oakes NP   latanoprost (XALATAN) 0.005 % ophthalmic solution place 1 drop into right eye at bedtime  Patient not taking: Reported on 5/16/2022  Brittney Cordoba MD     Review of Systems  Review of Systems   Constitutional: Negative for fatigue, fever, unexpected weight change and weight loss. Respiratory: Negative for cough, choking, chest tightness, shortness of breath and wheezing. Cardiovascular: Negative for chest pain, palpitations and leg swelling. Gastrointestinal: Negative for abdominal pain, anal bleeding, blood in stool, bowel incontinence, constipation, diarrhea, nausea and vomiting. Endocrine: Negative. Genitourinary: Negative for bladder incontinence, dysuria and pelvic pain. Musculoskeletal: Positive for back pain. Negative for joint swelling and myalgias. Skin: Negative. Neurological: Negative for dizziness, tingling, weakness, numbness, headaches and paresthesias. Psychiatric/Behavioral: Negative for sleep disturbance. All other systems reviewed and are negative. Objective:       Physical Exam:  /70   Pulse 68   Temp 98.5 °F (36.9 °C) (Temporal)   Wt 157 lb (71.2 kg)   SpO2 97%   BMI 28.72 kg/m²   Wt Readings from Last 3 Encounters:   05/16/22 157 lb (71.2 kg)   11/15/21 152 lb 6.4 oz (69.1 kg)   05/11/21 150 lb 6.4 oz (68.2 kg)         Physical Exam  Vitals and nursing note reviewed. Constitutional:       General: She is in acute distress (uncomfortable while seated, has to sit at an angle ). Appearance: She is well-developed. She is not ill-appearing. Eyes:      General: Lids are normal. Vision grossly intact. Cardiovascular:      Rate and Rhythm: Normal rate and regular rhythm. Heart sounds: Normal heart sounds, S1 normal and S2 normal. No murmur heard. No friction rub. No gallop. Pulmonary:      Effort: Pulmonary effort is normal. No respiratory distress. Breath sounds: Normal breath sounds. No wheezing. Abdominal:      General: Bowel sounds are normal.      Palpations: Abdomen is soft. There is no mass. Tenderness: There is no abdominal tenderness. There is no guarding. Musculoskeletal:      Lumbar back: Spasms and tenderness present. Positive right straight leg raise test.   Skin:     General: Skin is warm and dry. Capillary Refill: Capillary refill takes less than 2 seconds. Neurological:      General: No focal deficit present. Mental Status: She is alert and oriented to person, place, and time. Data Review    Lab Results   Component Value Date    CHOL 263 09/18/2014    TRIG 165 09/18/2014    HDL 49 11/15/2021          Assessment/Plan:      1. Acute midline low back pain without sciatica  - methylPREDNISolone (MEDROL DOSEPACK) 4 MG tablet; Take by mouth. Dispense: 1 kit; Refill: 0  - baclofen (LIORESAL) 10 MG tablet; Take 1 tablet by mouth 3 times daily  Dispense: 21 tablet; Refill: 0  - ketorolac (TORADOL) injection 30 mg    2. Sleeping difficulty  - amitriptyline (ELAVIL) 50 MG tablet; Take 1 tablet by mouth nightly  Dispense: 30 tablet; Refill: 3      3. Essential hypertension  Continue current regimen    4. Hypercholesterolemia  Continue simvastatin    5.  ELY (generalized anxiety disorder)  Continue current regimen        Health Maintenance Due   Topic Date Due    DTaP/Tdap/Td vaccine (1 - Tdap) Never done    Cervical cancer screen  Never done    Breast cancer screen  Never done    Shingles vaccine (1 of 2) Never done    A1C test (Diabetic or Prediabetic)  10/07/2021       Electronically signed by Juan Miguel Nguyen MD on 5/16/2022 at 2:28 PM

## 2022-05-17 DIAGNOSIS — I10 ESSENTIAL HYPERTENSION: ICD-10-CM

## 2022-05-17 RX ORDER — ATENOLOL 25 MG/1
TABLET ORAL
Qty: 90 TABLET | Refills: 1 | Status: SHIPPED | OUTPATIENT
Start: 2022-05-17

## 2022-05-17 NOTE — TELEPHONE ENCOUNTER
Last visit: 05/16/2022  Last Med refill: 11/15/2021  Does patient have enough medication for 72 hours: No:     Took last one yesterday     Next Visit Date:  No future appointments.     Health Maintenance   Topic Date Due    DTaP/Tdap/Td vaccine (1 - Tdap) Never done    Cervical cancer screen  Never done    Breast cancer screen  Never done    Shingles vaccine (1 of 2) Never done    A1C test (Diabetic or Prediabetic)  10/07/2021    Flu vaccine (Season Ended) 11/15/2022 (Originally 9/1/2022)    COVID-19 Vaccine (3 - Booster for Pfizer series) 11/16/2022 (Originally 4/18/2022)    Pneumococcal 0-64 years Vaccine (1 - PCV) 11/15/2024 (Originally 3/24/1966)    Lipids  11/15/2022    Depression Monitoring  05/16/2023    Colorectal Cancer Screen  11/06/2023    Hepatitis C screen  Completed    HIV screen  Completed    Hepatitis A vaccine  Aged Out    Hepatitis B vaccine  Aged Out    Hib vaccine  Aged Out    Meningococcal (ACWY) vaccine  Aged Out    Low dose CT lung screening  Discontinued       Hemoglobin A1C (%)   Date Value   10/07/2020 5.7   05/01/2019 5.7             ( goal A1C is < 7)   No results found for: LABMICR  LDL Cholesterol (mg/dL)   Date Value   11/15/2021 107   10/07/2020 86       (goal LDL is <100)   AST (U/L)   Date Value   11/15/2021 15     ALT (U/L)   Date Value   11/15/2021 13     BUN (mg/dL)   Date Value   11/15/2021 11     BP Readings from Last 3 Encounters:   05/16/22 116/70   11/15/21 122/80   05/11/21 130/80          (goal 120/80)    All Future Testing planned in [facility]              Patient Active Problem List:     Recurrent major depressive disorder, in partial remission (HCC)     Essential hypertension     Hypercholesterolemia     ELY (generalized anxiety disorder)     Jordan-Danlos syndrome     Temporomandibular joint-pain-dysfunction syndrome (TMJ)     Sacroiliac pain     Chronic generalized pain     Tobacco abuse

## 2022-06-10 DIAGNOSIS — F41.1 GAD (GENERALIZED ANXIETY DISORDER): ICD-10-CM

## 2022-06-10 DIAGNOSIS — I10 ESSENTIAL HYPERTENSION: ICD-10-CM

## 2022-06-10 NOTE — TELEPHONE ENCOUNTER
Last visit: 05/16/2022  Last Med refill: 11/15/2021  \"  Does patient have enough medication for 72 hours: No:     Next Visit Date:  No future appointments.     Health Maintenance   Topic Date Due    DTaP/Tdap/Td vaccine (1 - Tdap) Never done    Cervical cancer screen  Never done    Breast cancer screen  Never done    Shingles vaccine (1 of 2) Never done    A1C test (Diabetic or Prediabetic)  10/07/2021    Flu vaccine (Season Ended) 11/15/2022 (Originally 9/1/2022)    COVID-19 Vaccine (3 - Booster for Pfizer series) 11/16/2022 (Originally 4/18/2022)    Pneumococcal 0-64 years Vaccine (1 - PCV) 11/15/2024 (Originally 3/24/1966)    Lipids  11/15/2022    Depression Monitoring  05/16/2023    Colorectal Cancer Screen  11/06/2023    Hepatitis C screen  Completed    HIV screen  Completed    Hepatitis A vaccine  Aged Out    Hepatitis B vaccine  Aged Out    Hib vaccine  Aged Out    Meningococcal (ACWY) vaccine  Aged Out    Low dose CT lung screening  Discontinued       Hemoglobin A1C (%)   Date Value   10/07/2020 5.7   05/01/2019 5.7             ( goal A1C is < 7)   No results found for: LABMICR  LDL Cholesterol (mg/dL)   Date Value   11/15/2021 107   10/07/2020 86       (goal LDL is <100)   AST (U/L)   Date Value   11/15/2021 15     ALT (U/L)   Date Value   11/15/2021 13     BUN (mg/dL)   Date Value   11/15/2021 11     BP Readings from Last 3 Encounters:   05/16/22 116/70   11/15/21 122/80   05/11/21 130/80          (goal 120/80)    All Future Testing planned in [facility]              Patient Active Problem List:     Recurrent major depressive disorder, in partial remission (HCC)     Essential hypertension     Hypercholesterolemia     ELY (generalized anxiety disorder)     Jordan-Danlos syndrome     Temporomandibular joint-pain-dysfunction syndrome (TMJ)     Sacroiliac pain     Chronic generalized pain     Tobacco abuse

## 2022-06-13 RX ORDER — HYDROCHLOROTHIAZIDE 12.5 MG/1
12.5 CAPSULE, GELATIN COATED ORAL EVERY MORNING
Qty: 90 CAPSULE | Refills: 1 | Status: SHIPPED | OUTPATIENT
Start: 2022-06-13 | End: 2022-10-12

## 2022-06-13 RX ORDER — HYDROXYZINE PAMOATE 50 MG/1
CAPSULE ORAL
Qty: 180 CAPSULE | Refills: 1 | Status: SHIPPED | OUTPATIENT
Start: 2022-06-13

## 2022-07-13 DIAGNOSIS — E78.00 HYPERCHOLESTEROLEMIA: ICD-10-CM

## 2022-07-14 RX ORDER — SIMVASTATIN 40 MG
TABLET ORAL
Qty: 90 TABLET | Refills: 1 | Status: SHIPPED | OUTPATIENT
Start: 2022-07-14

## 2022-09-19 ENCOUNTER — TELEPHONE (OUTPATIENT)
Dept: FAMILY MEDICINE CLINIC | Age: 62
End: 2022-09-19

## 2022-09-19 NOTE — TELEPHONE ENCOUNTER
Reached out to pt to schedule a f/u appt, and mamm, pt stated she might be moving and will call after she is moved to schedule    Pt refused mamm, stated \"I don't do mammograms\"

## 2022-10-11 DIAGNOSIS — I10 ESSENTIAL HYPERTENSION: ICD-10-CM

## 2022-10-12 RX ORDER — HYDROCHLOROTHIAZIDE 12.5 MG/1
12.5 CAPSULE, GELATIN COATED ORAL EVERY MORNING
Qty: 90 CAPSULE | Refills: 1 | Status: SHIPPED | OUTPATIENT
Start: 2022-10-12

## 2022-10-12 NOTE — TELEPHONE ENCOUNTER
Last visit: 05/16/2022  Last Med refill: 09/11/2022  Does patient have enough medication for 72 hours: No:     Next Visit Date:  No future appointments.     Health Maintenance   Topic Date Due    DTaP/Tdap/Td vaccine (1 - Tdap) Never done    Cervical cancer screen  Never done    Breast cancer screen  Never done    Shingles vaccine (1 of 2) Never done    A1C test (Diabetic or Prediabetic)  10/07/2021    Flu vaccine (1) Never done    COVID-19 Vaccine (3 - Booster for Pfizer series) 11/16/2022 (Originally 4/18/2022)    Pneumococcal 0-64 years Vaccine (1 - PCV) 11/15/2024 (Originally 3/24/1966)    Lipids  11/15/2022    Depression Monitoring  05/16/2023    Colorectal Cancer Screen  11/06/2023    Hepatitis C screen  Completed    HIV screen  Completed    Hepatitis A vaccine  Aged Out    Hib vaccine  Aged Out    Meningococcal (ACWY) vaccine  Aged Out    Low dose CT lung screening  Discontinued       Hemoglobin A1C (%)   Date Value   10/07/2020 5.7   05/01/2019 5.7             ( goal A1C is < 7)   No results found for: LABMICR  LDL Cholesterol (mg/dL)   Date Value   11/15/2021 107   10/07/2020 86       (goal LDL is <100)   AST (U/L)   Date Value   11/15/2021 15     ALT (U/L)   Date Value   11/15/2021 13     BUN (mg/dL)   Date Value   11/15/2021 11     BP Readings from Last 3 Encounters:   05/16/22 116/70   11/15/21 122/80   05/11/21 130/80          (goal 120/80)    All Future Testing planned in [facility]              Patient Active Problem List:     Recurrent major depressive disorder, in partial remission ([facility] [address]     Essential hypertension     Hypercholesterolemia     ELY (generalized anxiety disorder)     Jordan-Danlos syndrome     Temporomandibular joint-pain-dysfunction syndrome (TMJ)     Sacroiliac pain     Chronic generalized pain     Tobacco abuse

## 2022-11-09 DIAGNOSIS — I10 ESSENTIAL HYPERTENSION: ICD-10-CM

## 2022-11-09 RX ORDER — ATENOLOL 25 MG/1
TABLET ORAL
Qty: 90 TABLET | Refills: 1 | Status: SHIPPED | OUTPATIENT
Start: 2022-11-09

## 2022-12-15 DIAGNOSIS — G89.29 CHRONIC GENERALIZED PAIN: ICD-10-CM

## 2022-12-15 DIAGNOSIS — F41.1 GAD (GENERALIZED ANXIETY DISORDER): ICD-10-CM

## 2022-12-15 DIAGNOSIS — R52 CHRONIC GENERALIZED PAIN: ICD-10-CM

## 2022-12-15 RX ORDER — HYDROXYZINE PAMOATE 50 MG/1
CAPSULE ORAL
Qty: 60 CAPSULE | Refills: 0 | Status: SHIPPED | OUTPATIENT
Start: 2022-12-15

## 2022-12-15 RX ORDER — DULOXETIN HYDROCHLORIDE 60 MG/1
CAPSULE, DELAYED RELEASE ORAL
Qty: 60 CAPSULE | Refills: 0 | Status: SHIPPED | OUTPATIENT
Start: 2022-12-15

## 2022-12-15 RX ORDER — NAPROXEN 500 MG/1
TABLET ORAL
Qty: 60 TABLET | Refills: 0 | Status: SHIPPED | OUTPATIENT
Start: 2022-12-15

## 2022-12-15 NOTE — TELEPHONE ENCOUNTER
Last visit: 05/16/2022  Last Med refill: 11/09/2022  Does patient have enough medication for 72 hours: Yes  No future appointments.   Next appt is 01/16/2022    Health Maintenance   Topic Date Due    DTaP/Tdap/Td vaccine (1 - Tdap) Never done    Cervical cancer screen  Never done    Breast cancer screen  Never done    Shingles vaccine (1 of 2) Never done    A1C test (Diabetic or Prediabetic)  10/07/2021    COVID-19 Vaccine (3 - Booster for Pfizer series) 01/13/2022    Flu vaccine (1) Never done    Lipids  11/15/2022    Pneumococcal 0-64 years Vaccine (1 - PCV) 11/15/2024 (Originally 3/24/1966)    Depression Monitoring  05/16/2023    Colorectal Cancer Screen  11/06/2023    Hepatitis C screen  Completed    HIV screen  Completed    Hepatitis A vaccine  Aged Out    Hib vaccine  Aged Out    Meningococcal (ACWY) vaccine  Aged Out    Low dose CT lung screening  Discontinued       Hemoglobin A1C (%)   Date Value   10/07/2020 5.7   05/01/2019 5.7             ( goal A1C is < 7)   No results found for: LABMICR  LDL Cholesterol (mg/dL)   Date Value   11/15/2021 107   10/07/2020 86       (goal LDL is <100)   AST (U/L)   Date Value   11/15/2021 15     ALT (U/L)   Date Value   11/15/2021 13     BUN (mg/dL)   Date Value   11/15/2021 11     BP Readings from Last 3 Encounters:   05/16/22 116/70   11/15/21 122/80   05/11/21 130/80          (goal 120/80)    All Future Testing planned in [facility]              Patient Active Problem List:     Recurrent major depressive disorder, in partial remission ([facility] [address]     Essential hypertension     Hypercholesterolemia     ELY (generalized anxiety disorder)     Jordan-Danlos syndrome     Temporomandibular joint-pain-dysfunction syndrome (TMJ)     Sacroiliac pain     Chronic generalized pain     Tobacco abuse

## 2023-01-17 DIAGNOSIS — E78.00 HYPERCHOLESTEROLEMIA: ICD-10-CM

## 2023-01-17 DIAGNOSIS — R52 CHRONIC GENERALIZED PAIN: ICD-10-CM

## 2023-01-17 DIAGNOSIS — F41.1 GAD (GENERALIZED ANXIETY DISORDER): ICD-10-CM

## 2023-01-17 DIAGNOSIS — G89.29 CHRONIC GENERALIZED PAIN: ICD-10-CM

## 2023-01-18 RX ORDER — HYDROXYZINE PAMOATE 50 MG/1
CAPSULE ORAL
Qty: 60 CAPSULE | Refills: 0 | Status: SHIPPED | OUTPATIENT
Start: 2023-01-18

## 2023-01-18 RX ORDER — SIMVASTATIN 40 MG
TABLET ORAL
Qty: 90 TABLET | Refills: 1 | Status: SHIPPED | OUTPATIENT
Start: 2023-01-18

## 2023-01-18 RX ORDER — NAPROXEN 500 MG/1
TABLET ORAL
Qty: 180 TABLET | Refills: 0 | Status: SHIPPED | OUTPATIENT
Start: 2023-01-18

## 2023-01-18 NOTE — TELEPHONE ENCOUNTER
Last visit: 05/06/2022  Last Med refill: 12/15/2022  Does patient have enough medication for 72 hours: No:     Next Visit Date:  Future Appointments   Date Time Provider Willis May   1/25/2023  1:45 PM Tiffanie Walker  [address] Maintenance   Topic Date Due    DTaP/Tdap/Td vaccine (1 - Tdap) Never done    Cervical cancer screen  Never done    Breast cancer screen  Never done    Shingles vaccine (1 of 2) Never done    A1C test (Diabetic or Prediabetic)  10/07/2021    COVID-19 Vaccine (3 - Booster for Pfizer series) 01/13/2022    Flu vaccine (1) Never done    Lipids  11/15/2022    Pneumococcal 0-64 years Vaccine (1 - PCV) 11/15/2024 (Originally 3/24/1966)    Depression Monitoring  05/16/2023    Colorectal Cancer Screen  11/06/2023    Hepatitis C screen  Completed    HIV screen  Completed    Hepatitis A vaccine  Aged Out    Hib vaccine  Aged Out    Meningococcal (ACWY) vaccine  Aged Out    Low dose CT lung screening  Discontinued       Hemoglobin A1C (%)   Date Value   10/07/2020 5.7   05/01/2019 5.7             ( goal A1C is < 7)   No results found for: LABMICR  LDL Cholesterol (mg/dL)   Date Value   11/15/2021 107   10/07/2020 86       (goal LDL is <100)   AST (U/L)   Date Value   11/15/2021 15     ALT (U/L)   Date Value   11/15/2021 13     BUN (mg/dL)   Date Value   11/15/2021 11     BP Readings from Last 3 Encounters:   05/16/22 116/70   11/15/21 122/80   05/11/21 130/80          (goal 120/80)    All Future Testing planned in [facility]              Patient Active Problem List:     Recurrent major depressive disorder, in partial remission ([facility] [address]     Essential hypertension     Hypercholesterolemia     ELY (generalized anxiety disorder)     Jordan-Danlos syndrome     Temporomandibular joint-pain-dysfunction syndrome (TMJ)     Sacroiliac pain     Chronic generalized pain     Tobacco abuse

## 2023-01-24 ENCOUNTER — OFFICE VISIT (OUTPATIENT)
Dept: FAMILY MEDICINE CLINIC | Age: 63
End: 2023-01-24

## 2023-01-24 ENCOUNTER — HOSPITAL ENCOUNTER (OUTPATIENT)
Age: 63
Setting detail: SPECIMEN
Discharge: HOME | End: 2023-01-24

## 2023-01-24 VITALS
OXYGEN SATURATION: 93 % | BODY MASS INDEX: 27.36 KG/M2 | WEIGHT: 149.6 LBS | HEART RATE: 73 BPM | DIASTOLIC BLOOD PRESSURE: 78 MMHG | SYSTOLIC BLOOD PRESSURE: 120 MMHG | TEMPERATURE: 97.8 F

## 2023-01-24 DIAGNOSIS — G47.9 SLEEPING DIFFICULTY: ICD-10-CM

## 2023-01-24 DIAGNOSIS — R73.01 ELEVATED FASTING BLOOD SUGAR: ICD-10-CM

## 2023-01-24 DIAGNOSIS — F41.1 GAD (GENERALIZED ANXIETY DISORDER): ICD-10-CM

## 2023-01-24 DIAGNOSIS — F33.41 RECURRENT MAJOR DEPRESSIVE DISORDER, IN PARTIAL REMISSION (HCC): ICD-10-CM

## 2023-01-24 DIAGNOSIS — E78.00 HYPERCHOLESTEROLEMIA: Primary | ICD-10-CM

## 2023-01-24 DIAGNOSIS — M70.52 PES ANSERINUS BURSITIS OF LEFT KNEE: ICD-10-CM

## 2023-01-24 DIAGNOSIS — I10 ESSENTIAL HYPERTENSION: ICD-10-CM

## 2023-01-24 DIAGNOSIS — G56.02 LEFT CARPAL TUNNEL SYNDROME: ICD-10-CM

## 2023-01-24 DIAGNOSIS — J01.90 ACUTE BACTERIAL SINUSITIS: ICD-10-CM

## 2023-01-24 DIAGNOSIS — Z72.0 TOBACCO ABUSE: ICD-10-CM

## 2023-01-24 DIAGNOSIS — Q79.60 EHLERS-DANLOS SYNDROME: ICD-10-CM

## 2023-01-24 DIAGNOSIS — B96.89 ACUTE BACTERIAL SINUSITIS: ICD-10-CM

## 2023-01-24 DIAGNOSIS — E78.00 HYPERCHOLESTEROLEMIA: ICD-10-CM

## 2023-01-24 LAB
ALBUMIN SERPL-MCNC: 4.3 G/DL (ref 3.5–5.2)
ALBUMIN/GLOBULIN RATIO: 1.9 (ref 1–2.5)
ALP BLD-CCNC: 124 U/L (ref 35–104)
ALT SERPL-CCNC: 17 U/L (ref 5–33)
ANION GAP SERPL CALCULATED.3IONS-SCNC: 14 MMOL/L (ref 9–17)
AST SERPL-CCNC: 18 U/L
BILIRUB SERPL-MCNC: 0.4 MG/DL (ref 0.3–1.2)
BUN BLDV-MCNC: 10 MG/DL (ref 8–23)
CALCIUM SERPL-MCNC: 9.7 MG/DL (ref 8.6–10.4)
CHLORIDE BLD-SCNC: 98 MMOL/L (ref 98–107)
CHOLESTEROL, FASTING: 188 MG/DL
CHOLESTEROL/HDL RATIO: 3.5
CO2: 29 MMOL/L (ref 20–31)
CREAT SERPL-MCNC: 0.72 MG/DL (ref 0.5–0.9)
GFR SERPL CREATININE-BSD FRML MDRD: >60 ML/MIN/1.73M2
GLUCOSE BLD-MCNC: 85 MG/DL (ref 70–99)
HDLC SERPL-MCNC: 53 MG/DL
LDL CHOLESTEROL: 109 MG/DL (ref 0–130)
POTASSIUM SERPL-SCNC: 4.5 MMOL/L (ref 3.7–5.3)
SODIUM BLD-SCNC: 141 MMOL/L (ref 135–144)
TOTAL PROTEIN: 6.6 G/DL (ref 6.4–8.3)
TRIGLYCERIDE, FASTING: 130 MG/DL

## 2023-01-24 RX ORDER — QUETIAPINE FUMARATE 25 MG/1
25 TABLET, FILM COATED ORAL
Qty: 30 TABLET | Refills: 3 | Status: SHIPPED | OUTPATIENT
Start: 2023-01-24

## 2023-01-24 RX ORDER — METHYLPREDNISOLONE 4 MG/1
TABLET ORAL
Qty: 1 KIT | Refills: 0 | Status: SHIPPED | OUTPATIENT
Start: 2023-01-24

## 2023-01-24 RX ORDER — HYDROXYZINE PAMOATE 50 MG/1
50 CAPSULE ORAL 2 TIMES DAILY
Qty: 180 CAPSULE | Refills: 1 | Status: SHIPPED | OUTPATIENT
Start: 2023-01-24

## 2023-01-24 RX ORDER — AMOXICILLIN 875 MG/1
875 TABLET, COATED ORAL 2 TIMES DAILY
Qty: 14 TABLET | Refills: 0 | Status: SHIPPED | OUTPATIENT
Start: 2023-01-24 | End: 2023-01-31

## 2023-01-24 RX ORDER — FLUTICASONE PROPIONATE 50 MCG
1 SPRAY, SUSPENSION (ML) NASAL DAILY
Qty: 16 G | Refills: 2 | Status: SHIPPED | OUTPATIENT
Start: 2023-01-24

## 2023-01-24 RX ORDER — DULOXETIN HYDROCHLORIDE 60 MG/1
60 CAPSULE, DELAYED RELEASE ORAL 2 TIMES DAILY
Qty: 180 CAPSULE | Refills: 1 | Status: SHIPPED | OUTPATIENT
Start: 2023-01-24

## 2023-01-24 SDOH — ECONOMIC STABILITY: FOOD INSECURITY: WITHIN THE PAST 12 MONTHS, THE FOOD YOU BOUGHT JUST DIDN'T LAST AND YOU DIDN'T HAVE MONEY TO GET MORE.: NEVER TRUE

## 2023-01-24 SDOH — ECONOMIC STABILITY: FOOD INSECURITY: WITHIN THE PAST 12 MONTHS, YOU WORRIED THAT YOUR FOOD WOULD RUN OUT BEFORE YOU GOT MONEY TO BUY MORE.: NEVER TRUE

## 2023-01-24 ASSESSMENT — ENCOUNTER SYMPTOMS
DIARRHEA: 0
WHEEZING: 0
BLOOD IN STOOL: 0
COUGH: 0
ANAL BLEEDING: 0
CONSTIPATION: 0
CHOKING: 0
CHEST TIGHTNESS: 0
ABDOMINAL PAIN: 0
SHORTNESS OF BREATH: 0
VOMITING: 0
NAUSEA: 0

## 2023-01-24 ASSESSMENT — PATIENT HEALTH QUESTIONNAIRE - PHQ9
SUM OF ALL RESPONSES TO PHQ QUESTIONS 1-9: 0
5. POOR APPETITE OR OVEREATING: 0
4. FEELING TIRED OR HAVING LITTLE ENERGY: 0
SUM OF ALL RESPONSES TO PHQ QUESTIONS 1-9: 0
6. FEELING BAD ABOUT YOURSELF - OR THAT YOU ARE A FAILURE OR HAVE LET YOURSELF OR YOUR FAMILY DOWN: 0
10. IF YOU CHECKED OFF ANY PROBLEMS, HOW DIFFICULT HAVE THESE PROBLEMS MADE IT FOR YOU TO DO YOUR WORK, TAKE CARE OF THINGS AT HOME, OR GET ALONG WITH OTHER PEOPLE: 0
1. LITTLE INTEREST OR PLEASURE IN DOING THINGS: 0
SUM OF ALL RESPONSES TO PHQ9 QUESTIONS 1 & 2: 0
7. TROUBLE CONCENTRATING ON THINGS, SUCH AS READING THE NEWSPAPER OR WATCHING TELEVISION: 0
3. TROUBLE FALLING OR STAYING ASLEEP: 0
9. THOUGHTS THAT YOU WOULD BE BETTER OFF DEAD, OR OF HURTING YOURSELF: 0
8. MOVING OR SPEAKING SO SLOWLY THAT OTHER PEOPLE COULD HAVE NOTICED. OR THE OPPOSITE, BEING SO FIGETY OR RESTLESS THAT YOU HAVE BEEN MOVING AROUND A LOT MORE THAN USUAL: 0
SUM OF ALL RESPONSES TO PHQ QUESTIONS 1-9: 0
2. FEELING DOWN, DEPRESSED OR HOPELESS: 0
SUM OF ALL RESPONSES TO PHQ QUESTIONS 1-9: 0

## 2023-01-24 ASSESSMENT — VISUAL ACUITY: OU: 1

## 2023-01-24 ASSESSMENT — SOCIAL DETERMINANTS OF HEALTH (SDOH): HOW HARD IS IT FOR YOU TO PAY FOR THE VERY BASICS LIKE FOOD, HOUSING, MEDICAL CARE, AND HEATING?: NOT VERY HARD

## 2023-01-24 NOTE — PATIENT INSTRUCTIONS
Instructions for using FLONASE  Gently blow your nose to clear your nostrils. Place the tip of the nozzle in one nostril and close the other nostril with your finger. Aim slightly away from the center of your nose, press the white nozzle and sniff the mist in gently. ... Exhale through your mouth. Your labs have been ordered today as fasting labs - this means you will need to fast overnight for at least 8 hours before you come in to get the blood drawn. You may have water, black tea or coffee without sugar or creamer prior to coming in for labs. Your lab results will be released to your [employer] account as they are resulted by the lab. I will send you a message regarding your results once I have had a chance to review them, usually within a couple of days, so if you have not heard from me it means I'm either waiting for some results, or that I have not had a moment to review the results.

## 2023-01-24 NOTE — PROGRESS NOTES
[facility]  [facility] [address]  [address] [facility] [address]  Dept: [phone]  Dept Fax: [phone]      Rylan Celeste is a 58 y.o. female who presents today for hermedical conditions/complaints as noted below. Rylan Celeste is c/o of Hypertension (F/u)        Assessment/Plan:     1. Hypercholesterolemia  -     Comprehensive Metabolic Panel; Future  -     Lipid, Fasting; Future  2. ELY (generalized anxiety disorder)  -     DULoxetine (CYMBALTA) 60 MG extended release capsule; Take 1 capsule by mouth 2 times daily, Disp-180 capsule, R-1NEEDS APPTNormal  -     hydrOXYzine pamoate (VISTARIL) 50 MG capsule; Take 1 capsule by mouth in the morning and at bedtime, Disp-180 capsule, R-1Normal  3. Essential hypertension  -     Comprehensive Metabolic Panel; Future  4. Sleeping difficulty  -     QUEtiapine (SEROQUEL) 25 MG tablet; Take 1 tablet by mouth nightly, Disp-30 tablet, R-3Normal  5. Jordan-Danlos syndrome  6. Elevated fasting blood sugar  -     Hemoglobin A1C; Future  7. Recurrent major depressive disorder, in partial remission (HCC)  -     DULoxetine (CYMBALTA) 60 MG extended release capsule; Take 1 capsule by mouth 2 times daily, Disp-180 capsule, R-1NEEDS APPTNormal  8. Tobacco abuse  9. Pes anserinus bursitis of left knee  -     methylPREDNISolone (MEDROL DOSEPACK) 4 MG tablet; Take by mouth., Disp-1 kit, R-0Normal  10. Acute bacterial sinusitis  -     amoxicillin (AMOXIL) 875 MG tablet; Take 1 tablet by mouth 2 times daily for 7 days, Disp-14 tablet, R-0Normal  -     fluticasone (FLONASE) 50 MCG/ACT nasal spray; 1 spray by Each Nostril route daily, Disp-16 g, R-2Normal  11. Left carpal tunnel syndrome  -     Procare Comfort Form Wrist Brace        No follow-ups on file. HPI     Had a cold about a month ago, treated with antibiotics and steroids at urgent care.  Got better, but has been having to clear her throat a lot and now she is starting to bring up green phlegm when she clears her throat. She denies fevers, chills, sore throat, shortness of breath, cough, swelling in her legs, rhinorrhea, sick contacts. Smoking 5 cig/day - down from 15-20/day   Left knee pain - anterior lower medial side, feels like her patella is popping out of place when she walks sometimes, takes several minutes for pain to subside when this happens. Knees take turns giving out on her when walking, has fallen a few times. Has not tried anything for the pain, she is on naproxen BID   Spot on L wrist that burns   Muscle loss on L thenar area compared to right side   No paresthesia L hand   She has noticed the sutures on her head are more prominent now  Continues to have trouble falling asleep even with elavil. Has tried melatonin, doxepin in the past. Vistaril does not help. BP Readings from Last 3 Encounters:   23 120/78   22 116/70   11/15/21 122/80              Past Medical History:   Diagnosis Date    Anxiety and depression     Arthritis     Chronic back pain     Degenerative joint disease     Diarrhea     Hyperlipidemia     Osteopenia       Past Surgical History:   Procedure Laterality Date    CERVICAL FUSION       SECTION      x 2    COLONOSCOPY      CYST REMOVAL Left     second finger    EYE SURGERY Bilateral     lasik    NASAL SEPTUM SURGERY Bilateral     OTHER SURGICAL HISTORY  2/23/15    exc. lt axillary mass    TONSILLECTOMY      TUBAL LIGATION         Family History   Problem Relation Age of Onset    Heart Disease Mother        Social History     Tobacco Use    Smoking status: Every Day     Packs/day: 0.75     Years: 41.00     Pack years: 30.75     Types: Cigarettes    Smokeless tobacco: Never   Substance Use Topics    Alcohol use: No        Allergies   Allergen Reactions    Mushroom Extract Complex      Prior to Visit Medications    Medication Sig Taking?  Authorizing Provider   naproxen (NAPROSYN) 500 MG tablet take 1 tablet by mouth twice a day with food Yes Tiffanie Eleni Luke MD   hydrOXYzine pamoate (VISTARIL) 50 MG capsule take 1 capsule by mouth twice a day Yes Tiffanie King MD   simvastatin (ZOCOR) 40 MG tablet take 1 tablet by mouth once daily Yes Tiffanie King MD   DULoxetine (CYMBALTA) 60 MG extended release capsule take 1 capsule by mouth twice a day Yes Tiffanie King MD   atenolol (TENORMIN) 25 MG tablet take 1 tablet by mouth once daily Yes Tiffanie King MD   hydroCHLOROthiazide (MICROZIDE) 12.5 MG capsule take 1 capsule by mouth every morning Yes Tiffanie King MD   cyclobenzaprine (FLEXERIL) 10 MG tablet Take by mouth as needed Yes CHRISSY Villegas NP   amitriptyline (ELAVIL) 50 MG tablet Take 1 tablet by mouth nightly Yes Tiffanie King MD   latanoprost (XALATAN) 0.005 % ophthalmic solution place 1 drop into right eye at bedtime Yes Anjelica Serrano MD   omeprazole (PRILOSEC) 20 MG delayed release capsule Take 1 capsule by mouth daily Yes Tiffanie King MD   baclofen (LIORESAL) 10 MG tablet Take 1 tablet by mouth 3 times daily  Patient not taking: Reported on 1/24/2023  Tiffanie King MD       Review of Systems     Review of Systems   Constitutional:  Negative for fatigue, fever and unexpected weight change. Respiratory:  Negative for cough, choking, chest tightness, shortness of breath and wheezing. Cardiovascular:  Negative for chest pain, palpitations and leg swelling. Gastrointestinal:  Negative for abdominal pain, anal bleeding, blood in stool, constipation, diarrhea, nausea and vomiting. Endocrine: Negative. Musculoskeletal:  Negative for joint swelling and myalgias. Skin: Negative. Neurological:  Negative for dizziness. Psychiatric/Behavioral:  Negative for sleep disturbance. All other systems reviewed and are negative.     Objective     /78 (Site: Left Upper Arm, Position: Sitting, Cuff Size: Medium Adult)   Pulse 73   Temp 97.8 °F (36.6 °C)   Wt 149 lb 9.6 oz (67.9 kg)   SpO2 93% BMI 27.36 kg/m²   Physical Exam  Vitals and nursing note reviewed. Constitutional:       General: She is not in acute distress. Appearance: She is well-developed. She is not ill-appearing. Eyes:      General: Lids are normal. Vision grossly intact. Cardiovascular:      Rate and Rhythm: Normal rate and regular rhythm. Heart sounds: Normal heart sounds, S1 normal and S2 normal. No murmur heard. No friction rub. No gallop. Pulmonary:      Effort: Pulmonary effort is normal. No respiratory distress. Breath sounds: Normal breath sounds. No wheezing. Abdominal:      General: Bowel sounds are normal.      Palpations: Abdomen is soft. There is no mass. Tenderness: There is no abdominal tenderness. There is no guarding. Musculoskeletal:         General: Normal range of motion. Skin:     General: Skin is warm and dry. Capillary Refill: Capillary refill takes less than 2 seconds. Neurological:      General: No focal deficit present. Mental Status: She is alert and oriented to person, place, and time. Data Review       Health Maintenance Due   Topic Date Due    Cervical cancer screen  Never done    A1C test (Diabetic or Prediabetic)  10/07/2021    Lipids  11/15/2022           Patient given educational materials- see patient instructions. Discussed use, benefit, and side effects of prescribedmedications. All patient questions answered. Pt voiced understanding. Reviewedhealth maintenance. Instructed to continue current medications, diet and exercise. Patient agreed with treatment plan. Follow up as directed.      Electronically signedby Cate Gordon MD on 1/24/2023

## 2023-01-24 NOTE — PROGRESS NOTES
Pt is here today for a regular 6 mo f/u     Pt has a cough that has been going on a little over a month now, does cough up phlegm sometimes, went to urgent care on 12/15/2022, was given a Zpack and prednisone, was tested for COVID that was negative, has a cough that seems like it gets caught in her throat, did have a sore throat for about 2 days, phlegm is a greenish color, constantly clearing her throat, having some ringing in her ears     Pt LT knee has been bothering her, states when she is walking its like her knee gives out on her, very painful, been going on for about 5/6 months now, no swelling     Pt states she has 2 dips in her head that feels like it is getting bigger, head is sensitive at times     LT wrist is laso bothering her, gets a burning sensation    Pt having sleeping issues, not being able to fall asleep, once she gets to sleep she sleeps \"like a baby\"

## 2023-01-25 LAB
ESTIMATED AVERAGE GLUCOSE: 114 MG/DL
HBA1C MFR BLD: 5.6 % (ref 4–6)

## 2023-01-25 NOTE — RESULT ENCOUNTER NOTE
Lab results mostly stable, will recheck in 1 year. We will need to recheck nonfasting liver enzymes at next visit. Please notify.

## 2023-01-31 ENCOUNTER — TELEPHONE (OUTPATIENT)
Dept: FAMILY MEDICINE CLINIC | Age: 63
End: 2023-01-31

## 2023-01-31 NOTE — TELEPHONE ENCOUNTER
Patent was seen on 01/24/2023. She was prescribed Seroquel 25 mg. She states is not helping her to sleep. She is asking if something else can be sent in.

## 2023-02-01 NOTE — TELEPHONE ENCOUNTER
Increase Seroquel to 50 mg at bedtime, reassess in 1 week.   If not working, will switch to trazodone

## 2023-02-07 DIAGNOSIS — J01.90 ACUTE BACTERIAL SINUSITIS: ICD-10-CM

## 2023-02-07 DIAGNOSIS — B96.89 ACUTE BACTERIAL SINUSITIS: ICD-10-CM

## 2023-02-07 DIAGNOSIS — K21.9 GASTROESOPHAGEAL REFLUX DISEASE, UNSPECIFIED WHETHER ESOPHAGITIS PRESENT: ICD-10-CM

## 2023-02-08 RX ORDER — OMEPRAZOLE 20 MG/1
CAPSULE, DELAYED RELEASE ORAL
Qty: 90 CAPSULE | Refills: 1 | Status: SHIPPED | OUTPATIENT
Start: 2023-02-08

## 2023-02-08 RX ORDER — AMOXICILLIN 875 MG/1
TABLET, COATED ORAL
Qty: 14 TABLET | Refills: 0 | OUTPATIENT
Start: 2023-02-08

## 2023-02-08 NOTE — TELEPHONE ENCOUNTER
Last visit: 01/24/2023  Last Med refill: 12/15/2022  Does patient have enough medication for 72 hours: Yes    Next Visit Date:  Future Appointments   Date Time Provider Willis Yadira   6/1/2023  3:00 PM Tiffanie Ortiz  [address] Maintenance   Topic Date Due    Cervical cancer screen  Never done    Flu vaccine (1) 06/30/2023 (Originally 8/1/2022)    DTaP/Tdap/Td vaccine (1 - Tdap) 01/24/2024 (Originally 3/24/1979)    Shingles vaccine (1 of 2) 01/24/2024 (Originally 3/24/2010)    COVID-19 Vaccine (3 - Booster for Brayola series) 01/24/2024 (Originally 1/13/2022)    Pneumococcal 0-64 years Vaccine (1 - PCV) 11/15/2024 (Originally 3/24/1966)    Colorectal Cancer Screen  11/06/2023    Breast cancer screen  01/23/2024    Lipids  01/24/2024    Depression Monitoring  01/24/2024    Hepatitis C screen  Completed    HIV screen  Completed    Hepatitis A vaccine  Aged Out    Hib vaccine  Aged Out    Meningococcal (ACWY) vaccine  Aged Out    Low dose CT lung screening  Discontinued       Hemoglobin A1C (%)   Date Value   01/24/2023 5.6   10/07/2020 5.7   05/01/2019 5.7             ( goal A1C is < 7)   No results found for: LABMICR  LDL Cholesterol (mg/dL)   Date Value   01/24/2023 109   11/15/2021 107       (goal LDL is <100)   AST (U/L)   Date Value   01/24/2023 18     ALT (U/L)   Date Value   01/24/2023 17     BUN (mg/dL)   Date Value   01/24/2023 10     BP Readings from Last 3 Encounters:   01/24/23 120/78   05/16/22 116/70   11/15/21 122/80          (goal 120/80)    All Future Testing planned in [facility]              Patient Active Problem List:     Recurrent major depressive disorder, in partial remission ([facility] [address]     Essential hypertension     Hypercholesterolemia     ELY (generalized anxiety disorder)     Jordan-Danlos syndrome     Temporomandibular joint-pain-dysfunction syndrome (TMJ)     Sacroiliac pain     Chronic generalized pain     Tobacco abuse

## 2023-02-14 ENCOUNTER — TELEPHONE (OUTPATIENT)
Dept: FAMILY MEDICINE CLINIC | Age: 63
End: 2023-02-14

## 2023-02-14 DIAGNOSIS — G89.29 CHRONIC PAIN OF RIGHT KNEE: Primary | ICD-10-CM

## 2023-02-14 DIAGNOSIS — R52 CHRONIC GENERALIZED PAIN: ICD-10-CM

## 2023-02-14 DIAGNOSIS — M54.50 ACUTE MIDLINE LOW BACK PAIN WITHOUT SCIATICA: Primary | ICD-10-CM

## 2023-02-14 DIAGNOSIS — G89.29 CHRONIC PAIN OF RIGHT KNEE: ICD-10-CM

## 2023-02-14 DIAGNOSIS — G89.29 CHRONIC GENERALIZED PAIN: ICD-10-CM

## 2023-02-14 DIAGNOSIS — M25.561 CHRONIC PAIN OF RIGHT KNEE: ICD-10-CM

## 2023-02-14 DIAGNOSIS — M25.561 CHRONIC PAIN OF RIGHT KNEE: Primary | ICD-10-CM

## 2023-02-14 DIAGNOSIS — M70.52 PES ANSERINUS BURSITIS OF LEFT KNEE: ICD-10-CM

## 2023-02-14 NOTE — TELEPHONE ENCOUNTER
Kaykay Mixon states her knee is still hurting. She was seen on 01/24/23 for this issue.  She was given  methylPREDNISolone (MEDROL DOSEPACK) 4 MG tablet;

## 2023-02-16 NOTE — TELEPHONE ENCOUNTER
Patient is willing to see ortho. She is requesting to go to [facility].  Referral has been placed for Dr Fawn Yeh

## 2023-03-14 DIAGNOSIS — G47.9 SLEEPING DIFFICULTY: ICD-10-CM

## 2023-03-15 ENCOUNTER — TELEPHONE (OUTPATIENT)
Dept: FAMILY MEDICINE CLINIC | Age: 63
End: 2023-03-15

## 2023-03-15 DIAGNOSIS — J01.90 ACUTE BACTERIAL SINUSITIS: Primary | ICD-10-CM

## 2023-03-15 DIAGNOSIS — H93.13 RINGING IN EARS, BILATERAL: ICD-10-CM

## 2023-03-15 DIAGNOSIS — B96.89 ACUTE BACTERIAL SINUSITIS: Primary | ICD-10-CM

## 2023-03-15 RX ORDER — AMITRIPTYLINE HYDROCHLORIDE 50 MG/1
50 TABLET, FILM COATED ORAL NIGHTLY
Qty: 30 TABLET | Refills: 3 | OUTPATIENT
Start: 2023-03-15

## 2023-03-15 NOTE — TELEPHONE ENCOUNTER
Patient is requesting a referral to ENT on [facility] due to ringing in ears and nasal issues    Is it okay to refer  Fax:  [phone]

## 2023-03-29 ENCOUNTER — TELEPHONE (OUTPATIENT)
Dept: FAMILY MEDICINE CLINIC | Age: 63
End: 2023-03-29

## 2023-03-29 DIAGNOSIS — H93.13 RINGING IN EARS, BILATERAL: ICD-10-CM

## 2023-03-29 DIAGNOSIS — J01.90 ACUTE BACTERIAL SINUSITIS: Primary | ICD-10-CM

## 2023-03-29 DIAGNOSIS — B96.89 ACUTE BACTERIAL SINUSITIS: Primary | ICD-10-CM

## 2023-03-29 NOTE — TELEPHONE ENCOUNTER
Pt needs new ENT referral due to insurance  She wants sent to [facility]  Fax # [phone]

## 2023-04-18 ENCOUNTER — HOSPITAL ENCOUNTER (EMERGENCY)
Age: 63
Discharge: HOME | End: 2023-04-18
Attending: EMERGENCY MEDICINE
Payer: MEDICAID

## 2023-04-18 VITALS
RESPIRATION RATE: 19 BRPM | HEART RATE: 81 BPM | OXYGEN SATURATION: 93 % | TEMPERATURE: 97.2 F | DIASTOLIC BLOOD PRESSURE: 80 MMHG | SYSTOLIC BLOOD PRESSURE: 142 MMHG

## 2023-04-18 DIAGNOSIS — H57.11 PAIN AROUND RIGHT EYE: Primary | ICD-10-CM

## 2023-04-18 LAB
ABSOLUTE EOS #: 0.07 K/UL (ref 0–0.44)
ABSOLUTE IMMATURE GRANULOCYTE: <0.03 K/UL (ref 0–0.3)
ABSOLUTE LYMPH #: 2.13 K/UL (ref 1.1–3.7)
ABSOLUTE MONO #: 0.65 K/UL (ref 0.1–1.2)
ANION GAP SERPL CALCULATED.3IONS-SCNC: 8 MMOL/L (ref 9–17)
BASOPHILS # BLD: 1 % (ref 0–2)
BASOPHILS ABSOLUTE: 0.05 K/UL (ref 0–0.2)
BUN SERPL-MCNC: 8 MG/DL (ref 8–23)
CALCIUM SERPL-MCNC: 10.4 MG/DL (ref 8.6–10.4)
CHLORIDE SERPL-SCNC: 98 MMOL/L (ref 98–107)
CO2 SERPL-SCNC: 32 MMOL/L (ref 20–31)
CREAT SERPL-MCNC: 0.77 MG/DL (ref 0.5–0.9)
CRP SERPL HS-MCNC: <3 MG/L (ref 0–5)
EOSINOPHILS RELATIVE PERCENT: 1 % (ref 1–4)
ERYTHROCYTE [SEDIMENTATION RATE] IN BLOOD BY WESTERGREN METHOD: 1 MM/HR (ref 0–30)
GFR SERPL CREATININE-BSD FRML MDRD: >60 ML/MIN/1.73M2
GLUCOSE SERPL-MCNC: 82 MG/DL (ref 70–99)
HCT VFR BLD AUTO: 49.6 % (ref 36.3–47.1)
HGB BLD-MCNC: 15.9 G/DL (ref 11.9–15.1)
IMMATURE GRANULOCYTES: 0 %
LYMPHOCYTES # BLD: 22 % (ref 24–43)
MCH RBC QN AUTO: 29 PG (ref 25.2–33.5)
MCHC RBC AUTO-ENTMCNC: 32.1 G/DL (ref 28.4–34.8)
MCV RBC AUTO: 90.5 FL (ref 82.6–102.9)
MONOCYTES # BLD: 7 % (ref 3–12)
NRBC AUTOMATED: 0 PER 100 WBC
PDW BLD-RTO: 13.2 % (ref 11.8–14.4)
PLATELET # BLD AUTO: 241 K/UL (ref 138–453)
PMV BLD AUTO: 10.8 FL (ref 8.1–13.5)
POTASSIUM SERPL-SCNC: 4.2 MMOL/L (ref 3.7–5.3)
RBC # BLD: 5.48 M/UL (ref 3.95–5.11)
SEG NEUTROPHILS: 69 % (ref 36–65)
SEGMENTED NEUTROPHILS ABSOLUTE COUNT: 6.96 K/UL (ref 1.5–8.1)
SODIUM SERPL-SCNC: 138 MMOL/L (ref 135–144)
WBC # BLD AUTO: 9.9 K/UL (ref 3.5–11.3)

## 2023-04-18 PROCEDURE — 85652 RBC SED RATE AUTOMATED: CPT

## 2023-04-18 PROCEDURE — 6370000000 HC RX 637 (ALT 250 FOR IP): Performed by: STUDENT IN AN ORGANIZED HEALTH CARE EDUCATION/TRAINING PROGRAM

## 2023-04-18 PROCEDURE — 80048 BASIC METABOLIC PNL TOTAL CA: CPT

## 2023-04-18 PROCEDURE — 85025 COMPLETE CBC W/AUTO DIFF WBC: CPT

## 2023-04-18 PROCEDURE — 99283 EMERGENCY DEPT VISIT LOW MDM: CPT

## 2023-04-18 PROCEDURE — 86140 C-REACTIVE PROTEIN: CPT

## 2023-04-18 RX ORDER — PROPARACAINE HYDROCHLORIDE 5 MG/ML
1 SOLUTION/ DROPS OPHTHALMIC ONCE
Status: DISCONTINUED | OUTPATIENT
Start: 2023-04-18 | End: 2023-04-18

## 2023-04-18 RX ORDER — TETRACAINE HYDROCHLORIDE 5 MG/ML
1 SOLUTION OPHTHALMIC ONCE
Status: COMPLETED | OUTPATIENT
Start: 2023-04-18 | End: 2023-04-18

## 2023-04-18 RX ADMIN — FLUORESCEIN SODIUM 1 MG: 1 STRIP OPHTHALMIC at 20:48

## 2023-04-18 RX ADMIN — TETRACAINE HYDROCHLORIDE 1 DROP: 5 SOLUTION OPHTHALMIC at 20:48

## 2023-04-18 ASSESSMENT — PAIN DESCRIPTION - LOCATION: LOCATION: EYE

## 2023-04-18 ASSESSMENT — PAIN DESCRIPTION - ORIENTATION: ORIENTATION: RIGHT

## 2023-04-18 ASSESSMENT — PAIN SCALES - GENERAL: PAINLEVEL_OUTOF10: 10

## 2023-04-18 ASSESSMENT — PAIN - FUNCTIONAL ASSESSMENT: PAIN_FUNCTIONAL_ASSESSMENT: 0-10

## 2023-04-18 NOTE — ED TRIAGE NOTES
Pt here for right eye pain  Feels like someone=ne is stabbing her into her right eye, having some pressure to the right side of her face and sometimes into her ear    This morning there was clear drainage, her [place] is naveen , and feels itchy   Was by her PCP to go to ER if this eye problem occurs again    Does report not being able to see out of her right eye

## 2023-04-19 ASSESSMENT — ENCOUNTER SYMPTOMS
ABDOMINAL PAIN: 0
EYE REDNESS: 1
COUGH: 0
EYE PAIN: 1
EYE DISCHARGE: 1
VOMITING: 0
SHORTNESS OF BREATH: 0
DIARRHEA: 0
RHINORRHEA: 0
NAUSEA: 0

## 2023-04-19 NOTE — ED NOTES
The following labs were labeled with appropriate pt sticker and tubed to lab:     [x] Blue     [x] Lavender   [] on ice  [x] Green/yellow  [] Green/black [] on ice  [] Carlotta Sit  [] on ice  [] Yellow  [] Red  [] Type/ Screen  [] ABG  [] VBG    [] COVID-19 swab    [] Rapid  [] PCR  [] Flu swab  [] Peds Viral Panel     [] Urine Sample  [] Fecal Sample  [] Pelvic Cultures  [] Blood Cultures  [] X 2  [] STREP Cultures       Kareem Yañez RN  04/18/23 2110

## 2023-04-19 NOTE — DISCHARGE INSTRUCTIONS
Thank you for visiting [facility] Emergency Department. For your symptoms we checked your blood sugar, blood counts, electrolytes and kidney function. These were all normal.  The labs to look for inflammation which we will treat with steroids were normal    Your imaging showed no retinal detachment or vitreous detachment. Your intraocular pressures were high in the right eye. They were 47. I spoke with the ophthalmologist who would like you to call their office tomorrow to schedule an appointment as soon as possible for recheck. They believe that you are having something called neovascularization which is where several different blood vessels form after a clot like the one that you have. It can be very painful and actually causes a glaucoma but the traditional treatments for glaucoma do not work. Most of the medications and treatments that they would do are often not very successful. Sometimes even have to have the eye removed. If you develop any worsening of your symptoms, severe pain, chest pain, trouble breathing, passing out, vomiting or other worrisome symptoms please return to the emergency department immediately.      Please call your primary care provider as soon as possible for follow up

## 2023-04-19 NOTE — ED PROVIDER NOTES
[facility] [address] [facility]     Emergency Department     Faculty Attestation        I performed a history and physical examination of the patient and discussed management with the resident. I reviewed the residents note and agree with the documented findings and plan of care. Any areas of disagreement are noted on the chart. I was personally present for the key portions of any procedures. I have documented in the chart those procedures where I was not present during the key portions. I have reviewed the emergency nurses triage note. I agree with the chief complaint, past medical history, past surgical history, allergies, medications, social and family history as documented unless otherwise noted below. For mid-level providers such as nurse practitioners as well as physicians assistants:    I have personally seen and evaluated the patient. I find the patient's history and physical exam are consistent with NP/PA documentation. I agree with the care provided, treatment rendered, disposition, & follow-up plan. Additional findings are as noted. Vital Signs: BP (!) 142/80   Pulse 81   Temp 97.2 °F (36.2 °C) (Oral)   Resp 19   SpO2 93%   PCP:  DINO Mckay MD    Pertinent Comments: Worsening eye pain. It is dilated, patient states it is usually dilated.   Will check pressures, standing, discussed with ophthalmology      Critical Care  None          Elke Lorenzo MD    Attending Emergency Medicine Physician            Sonia Lassiter MD  04/18/23 2003
back: Neck supple. No rigidity. Skin:     General: Skin is warm and dry. Findings: No rash. Neurological:      Mental Status: She is alert. Psychiatric:         Mood and Affect: Mood normal.         Behavior: Behavior normal.       DIFFERENTIAL  DIAGNOSIS     DDX: acute angle glaucoma, retinal vein occlusion, retinal detachment, vitreous detachment, periorbital cellulitis, orbital cellulitis, foreign body, corneal abrasion, giant cell arteritis     Medical Decision Making  61 y.o. female with past medical history of retinal vein occlusion who presents with worsening right eye vision and redness with new onset eye pain and clouding of pupil. Patient has slight proptosis of right eye with significant scleral injection. Pupil is dilated at 7 mm and fixed. Pupil is cloudy. EOMI are painful horizontally. I am highly concerned for acute angle glaucoma. It is somewhat difficult to determine a timeline of patient's symptoms due to the chronic nature of many of them. Plan for intraocular pressure measurements, woods lamp and staining and bedside ultrasound of right eye. 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 Will consult with ophthalmology. Will check basic labs for infection and inflammatory markers for giant cell arteritis. Amount and/or Complexity of Data Reviewed  Labs: ordered. Risk  Prescription drug management.           DIAGNOSTIC RESULTS / EMERGENCYDEPARTMENT COURSE / MDM     LABS:  Results for orders placed or performed during the hospital encounter of 70/47/94   Basic Metabolic Panel   Result Value Ref Range    Glucose 82 70 - 99 mg/dL    BUN 8 8 - 23 mg/dL    Creatinine 0.77 0.50 - 0.90 mg/dL    Est, Glom Filt Rate >60 >60 mL/min/1.73m2    Calcium 10.4 8.6 - 10.4 mg/dL    Sodium 138 135 - 144 mmol/L    Potassium 4.2 3.7 - 5.3 mmol/L    Chloride 98 98 - 107 mmol/L    CO2 32 (H) 20 - 31 mmol/L    Anion Gap 8 (L) 9 - 17 mmol/L   CBC with Auto Differential

## 2023-04-20 DIAGNOSIS — B96.89 ACUTE BACTERIAL SINUSITIS: ICD-10-CM

## 2023-04-20 DIAGNOSIS — J01.90 ACUTE BACTERIAL SINUSITIS: ICD-10-CM

## 2023-04-24 RX ORDER — FLUTICASONE PROPIONATE 50 MCG
SPRAY, SUSPENSION (ML) NASAL
Qty: 16 G | Refills: 2 | Status: SHIPPED | OUTPATIENT
Start: 2023-04-24

## 2023-05-22 DIAGNOSIS — R52 CHRONIC GENERALIZED PAIN: ICD-10-CM

## 2023-05-22 DIAGNOSIS — K59.04 CHRONIC IDIOPATHIC CONSTIPATION: ICD-10-CM

## 2023-05-22 DIAGNOSIS — G89.29 CHRONIC GENERALIZED PAIN: ICD-10-CM

## 2023-05-22 DIAGNOSIS — I10 ESSENTIAL HYPERTENSION: ICD-10-CM

## 2023-05-22 RX ORDER — HYDROCHLOROTHIAZIDE 12.5 MG/1
12.5 CAPSULE, GELATIN COATED ORAL EVERY MORNING
Qty: 90 CAPSULE | Refills: 1 | Status: SHIPPED | OUTPATIENT
Start: 2023-05-22

## 2023-05-22 RX ORDER — POLYETHYLENE GLYCOL 3350 17 G/17G
POWDER, FOR SOLUTION ORAL
Qty: 510 G | Refills: 0 | Status: SHIPPED | OUTPATIENT
Start: 2023-05-22

## 2023-05-22 RX ORDER — NAPROXEN 500 MG/1
TABLET ORAL
Qty: 180 TABLET | Refills: 0 | Status: SHIPPED | OUTPATIENT
Start: 2023-05-22

## 2023-05-22 NOTE — TELEPHONE ENCOUNTER
Last visit: 04/12/2023  Last Med refill: 03/15/2023  Does patient have enough medication for 72 hours: Yes    Next Visit Date:  Future Appointments   Date Time Provider Willis Yadira   6/1/2023  3:00 PM Tiffanie Goldstein MD [facility]   7/12/2023  1:45 PM Tiffanie Goldstein  [address] Maintenance   Topic Date Due    Cervical cancer screen  Never done    DTaP/Tdap/Td vaccine (1 - Tdap) 01/24/2024 (Originally 3/24/1979)    Shingles vaccine (1 of 2) 01/24/2024 (Originally 3/24/2010)    COVID-19 Vaccine (3 - Booster for Pfizer series) 01/24/2024 (Originally 1/13/2022)    Pneumococcal 0-64 years Vaccine (1 - PCV) 11/15/2024 (Originally 3/24/1966)    Flu vaccine (Season Ended) 08/01/2023    Colorectal Cancer Screen  11/06/2023    Breast cancer screen  01/23/2024    Lipids  01/24/2024    Depression Monitoring  01/24/2024    Hepatitis C screen  Completed    HIV screen  Completed    Hepatitis A vaccine  Aged Out    Hib vaccine  Aged Out    Meningococcal (ACWY) vaccine  Aged Out    A1C test (Diabetic or Prediabetic)  Discontinued    Low dose CT lung screening  Discontinued       Hemoglobin A1C (%)   Date Value   01/24/2023 5.6   10/07/2020 5.7   05/01/2019 5.7             ( goal A1C is < 7)   No results found for: LABMICR  LDL Cholesterol (mg/dL)   Date Value   01/24/2023 109   11/15/2021 107       (goal LDL is <100)   AST (U/L)   Date Value   04/12/2023 14     ALT (U/L)   Date Value   04/12/2023 11     BUN (mg/dL)   Date Value   04/18/2023 8     BP Readings from Last 3 Encounters:   04/18/23 (!) 142/80   04/12/23 120/76   01/24/23 120/78          (goal 120/80)    All Future Testing planned in [facility]              Patient Active Problem List:     Recurrent major depressive disorder, in partial remission ([facility] [address]     Essential hypertension     Hypercholesterolemia     ELY (generalized anxiety disorder)     Jordan-Danlos syndrome     Temporomandibular joint-pain-dysfunction syndrome (TMJ)

## 2023-06-23 DIAGNOSIS — G47.9 SLEEPING DIFFICULTY: ICD-10-CM

## 2023-06-23 RX ORDER — QUETIAPINE FUMARATE 50 MG/1
50 TABLET, FILM COATED ORAL NIGHTLY
Qty: 30 TABLET | Refills: 2 | Status: SHIPPED | OUTPATIENT
Start: 2023-06-23

## 2023-06-23 NOTE — TELEPHONE ENCOUNTER
Patient called asking for a refill on Seroquel 25mg. States she has been taking two tablets every night and is asking if the script can be changed to this. States she started taking two shortly after medication was first prescribed. Did see a telephone encounter from 1/31/23 stating patient can increase to 50mg at bedtime.

## 2023-07-17 NOTE — TELEPHONE ENCOUNTER
Last visit: 05/16/2022  Last Med refill: 05/17/2022  Does patient have enough medication for 72 hours: Yes    Next Visit Date:  No future appointments.     Health Maintenance   Topic Date Due    DTaP/Tdap/Td vaccine (1 - Tdap) Never done    Cervical cancer screen  Never done    Breast cancer screen  Never done    Shingles vaccine (1 of 2) Never done    A1C test (Diabetic or Prediabetic)  10/07/2021    Flu vaccine (1) Never done    Lipids  11/15/2022    COVID-19 Vaccine (3 - Booster for Pfizer series) 11/16/2022 (Originally 1/13/2022)    Pneumococcal 0-64 years Vaccine (1 - PCV) 11/15/2024 (Originally [birth year]/24/1966)    Depression Monitoring  05/16/2023    Colorectal Cancer Screen  11/06/2023    Hepatitis C screen  Completed    HIV screen  Completed    Hepatitis A vaccine  Aged Out    Hib vaccine  Aged Out    Meningococcal (ACWY) vaccine  Aged Out    Low dose CT lung screening  Discontinued       Hemoglobin A1C (%)   Date Value   10/07/2020 5.7   05/01/2019 5.7             ( goal A1C is < 7)   No results found for: LABMICR  LDL Cholesterol (mg/dL)   Date Value   11/15/2021 107   10/07/2020 86       (goal LDL is <100)   AST (U/L)   Date Value   11/15/2021 15     ALT (U/L)   Date Value   11/15/2021 13     BUN (mg/dL)   Date Value   11/15/2021 11     BP Readings from Last 3 Encounters:   05/16/22 116/70   11/15/21 122/80   05/11/21 130/80          (goal 120/80)    All Future Testing planned in [facility]              Patient Active Problem List:     Recurrent major depressive disorder, in partial remission ([facility] [address]     Essential hypertension     Hypercholesterolemia     ELY (generalized anxiety disorder)     Jordan-Danlos syndrome     Temporomandibular joint-pain-dysfunction syndrome (TMJ)     Sacroiliac pain     Chronic generalized pain     Tobacco abuse
no

## 2023-07-19 DIAGNOSIS — E78.00 HYPERCHOLESTEROLEMIA: ICD-10-CM

## 2023-07-19 NOTE — TELEPHONE ENCOUNTER
Last visit: 4/12/23  Last Med refill: 1/18/23  Does patient have enough medication for 72 hours: No:     Next Visit Date:  No future appointments.     Health Maintenance   Topic Date Due    Cervical cancer screen  Never done    DTaP/Tdap/Td vaccine (1 - Tdap) 01/24/2024 (Originally 3/24/1979)    Shingles vaccine (1 of 2) 01/24/2024 (Originally 3/24/2010)    COVID-19 Vaccine (3 - Booster for Pfizer series) 01/24/2024 (Originally 1/13/2022)    Pneumococcal 0-64 years Vaccine (1 - PCV) 11/15/2024 (Originally 3/24/1966)    Flu vaccine (1) 08/01/2023    Colorectal Cancer Screen  11/06/2023    Breast cancer screen  01/23/2024    Lipids  01/24/2024    Depression Monitoring  01/24/2024    Hepatitis C screen  Completed    HIV screen  Completed    Hepatitis A vaccine  Aged Out    Hib vaccine  Aged Out    Meningococcal (ACWY) vaccine  Aged Out    A1C test (Diabetic or Prediabetic)  Discontinued    Low dose CT lung screening &/or counseling  Discontinued       Hemoglobin A1C (%)   Date Value   01/24/2023 5.6   10/07/2020 5.7   05/01/2019 5.7             ( goal A1C is < 7)   No results found for: LABMICR  LDL Cholesterol (mg/dL)   Date Value   01/24/2023 109   11/15/2021 107       (goal LDL is <100)   AST (U/L)   Date Value   04/12/2023 14     ALT (U/L)   Date Value   04/12/2023 11     BUN (mg/dL)   Date Value   04/18/2023 8     BP Readings from Last 3 Encounters:   04/18/23 (!) 142/80   04/12/23 120/76   01/24/23 120/78          (goal 120/80)    All Future Testing planned in [facility]              Patient Active Problem List:     Recurrent major depressive disorder, in partial remission ([address])     Essential hypertension     Hypercholesterolemia     ELY (generalized anxiety disorder)     Jordan-Danlos syndrome     Temporomandibular joint-pain-dysfunction syndrome (TMJ)     Sacroiliac pain     Chronic generalized pain     Tobacco abuse

## 2023-07-20 RX ORDER — SIMVASTATIN 40 MG
TABLET ORAL
Qty: 90 TABLET | Refills: 1 | Status: SHIPPED | OUTPATIENT
Start: 2023-07-20

## 2023-08-16 DIAGNOSIS — F41.1 GAD (GENERALIZED ANXIETY DISORDER): ICD-10-CM

## 2023-08-16 RX ORDER — HYDROXYZINE PAMOATE 50 MG/1
CAPSULE ORAL
Qty: 180 CAPSULE | Refills: 1 | Status: SHIPPED | OUTPATIENT
Start: 2023-08-16

## 2023-09-28 ENCOUNTER — HOSPITAL ENCOUNTER (OUTPATIENT)
Age: 63
Setting detail: SPECIMEN
Discharge: HOME | End: 2023-09-28

## 2023-09-28 ENCOUNTER — OFFICE VISIT (OUTPATIENT)
Dept: FAMILY MEDICINE CLINIC | Age: 63
End: 2023-09-28

## 2023-09-28 VITALS
SYSTOLIC BLOOD PRESSURE: 120 MMHG | BODY MASS INDEX: 26.85 KG/M2 | OXYGEN SATURATION: 98 % | DIASTOLIC BLOOD PRESSURE: 82 MMHG | HEART RATE: 70 BPM | TEMPERATURE: 98.2 F | WEIGHT: 146.8 LBS

## 2023-09-28 DIAGNOSIS — Z87.891 PERSONAL HISTORY OF TOBACCO USE, PRESENTING HAZARDS TO HEALTH: ICD-10-CM

## 2023-09-28 DIAGNOSIS — I10 ESSENTIAL HYPERTENSION: ICD-10-CM

## 2023-09-28 DIAGNOSIS — E78.00 HYPERCHOLESTEROLEMIA: ICD-10-CM

## 2023-09-28 DIAGNOSIS — G89.29 CHRONIC GENERALIZED PAIN: ICD-10-CM

## 2023-09-28 DIAGNOSIS — Z13.29 SCREENING FOR THYROID DISORDER: ICD-10-CM

## 2023-09-28 DIAGNOSIS — Z00.01 ENCOUNTER FOR WELL ADULT EXAM WITH ABNORMAL FINDINGS: Primary | ICD-10-CM

## 2023-09-28 DIAGNOSIS — Z13.220 SCREENING, LIPID: ICD-10-CM

## 2023-09-28 DIAGNOSIS — E55.9 VITAMIN D DEFICIENCY: ICD-10-CM

## 2023-09-28 DIAGNOSIS — Z13.0 SCREENING, ANEMIA, DEFICIENCY, IRON: ICD-10-CM

## 2023-09-28 DIAGNOSIS — R52 CHRONIC GENERALIZED PAIN: ICD-10-CM

## 2023-09-28 DIAGNOSIS — H93.11 TINNITUS AURIUM, RIGHT: ICD-10-CM

## 2023-09-28 DIAGNOSIS — K59.04 CHRONIC IDIOPATHIC CONSTIPATION: ICD-10-CM

## 2023-09-28 DIAGNOSIS — H66.91 RIGHT OTITIS MEDIA, UNSPECIFIED OTITIS MEDIA TYPE: ICD-10-CM

## 2023-09-28 DIAGNOSIS — F41.1 GAD (GENERALIZED ANXIETY DISORDER): ICD-10-CM

## 2023-09-28 LAB
BASOPHILS # BLD: 0.04 K/UL (ref 0–0.2)
BASOPHILS NFR BLD: 1 % (ref 0–2)
EOSINOPHIL # BLD: 0.1 K/UL (ref 0–0.44)
EOSINOPHILS RELATIVE PERCENT: 1 % (ref 1–4)
ERYTHROCYTE [DISTWIDTH] IN BLOOD BY AUTOMATED COUNT: 13.2 % (ref 11.8–14.4)
HCT VFR BLD AUTO: 45 % (ref 36.3–47.1)
HGB BLD-MCNC: 14.6 G/DL (ref 11.9–15.1)
IMM GRANULOCYTES # BLD AUTO: <0.03 K/UL (ref 0–0.3)
IMM GRANULOCYTES NFR BLD: 0 %
LYMPHOCYTES NFR BLD: 1.99 K/UL (ref 1.1–3.7)
LYMPHOCYTES RELATIVE PERCENT: 28 % (ref 24–43)
MCH RBC QN AUTO: 30.3 PG (ref 25.2–33.5)
MCHC RBC AUTO-ENTMCNC: 32.4 G/DL (ref 28.4–34.8)
MCV RBC AUTO: 93.4 FL (ref 82.6–102.9)
MONOCYTES NFR BLD: 0.63 K/UL (ref 0.1–1.2)
MONOCYTES NFR BLD: 9 % (ref 3–12)
NEUTROPHILS NFR BLD: 61 % (ref 36–65)
NEUTS SEG NFR BLD: 4.36 K/UL (ref 1.5–8.1)
NRBC BLD-RTO: 0 PER 100 WBC
PLATELET # BLD AUTO: 197 K/UL (ref 138–453)
PMV BLD AUTO: 12.3 FL (ref 8.1–13.5)
RBC # BLD AUTO: 4.82 M/UL (ref 3.95–5.11)
WBC OTHER # BLD: 7.1 K/UL (ref 3.5–11.3)

## 2023-09-28 RX ORDER — OFLOXACIN 3 MG/ML
5 SOLUTION AURICULAR (OTIC) 2 TIMES DAILY
Qty: 5 ML | Refills: 0 | Status: SHIPPED | OUTPATIENT
Start: 2023-09-28 | End: 2023-10-08

## 2023-09-28 RX ORDER — GUAIFENESIN 400 MG/1
400 TABLET ORAL 4 TIMES DAILY PRN
COMMUNITY

## 2023-09-28 RX ORDER — BRIMONIDINE TARTRATE 2 MG/ML
SOLUTION/ DROPS OPHTHALMIC
COMMUNITY
Start: 2023-07-10

## 2023-09-28 RX ORDER — DORZOLAMIDE HYDROCHLORIDE AND TIMOLOL MALEATE 20; 5 MG/ML; MG/ML
SOLUTION/ DROPS OPHTHALMIC
COMMUNITY
Start: 2023-09-09

## 2023-09-28 SDOH — ECONOMIC STABILITY: INCOME INSECURITY: HOW HARD IS IT FOR YOU TO PAY FOR THE VERY BASICS LIKE FOOD, HOUSING, MEDICAL CARE, AND HEATING?: NOT VERY HARD

## 2023-09-28 SDOH — ECONOMIC STABILITY: FOOD INSECURITY: WITHIN THE PAST 12 MONTHS, YOU WORRIED THAT YOUR FOOD WOULD RUN OUT BEFORE YOU GOT MONEY TO BUY MORE.: NEVER TRUE

## 2023-09-28 SDOH — ECONOMIC STABILITY: HOUSING INSECURITY
IN THE LAST 12 MONTHS, WAS THERE A TIME WHEN YOU DID NOT HAVE A STEADY PLACE TO SLEEP OR SLEPT IN A SHELTER (INCLUDING NOW)?: NO

## 2023-09-28 SDOH — ECONOMIC STABILITY: FOOD INSECURITY: WITHIN THE PAST 12 MONTHS, THE FOOD YOU BOUGHT JUST DIDN'T LAST AND YOU DIDN'T HAVE MONEY TO GET MORE.: NEVER TRUE

## 2023-09-28 ASSESSMENT — ENCOUNTER SYMPTOMS
BLOOD IN STOOL: 0
WHEEZING: 0
CHEST TIGHTNESS: 0
CHOKING: 0
VOMITING: 0
CONSTIPATION: 0
NAUSEA: 0
ANAL BLEEDING: 0
BACK PAIN: 1
COUGH: 0
ABDOMINAL PAIN: 0
DIARRHEA: 0
SHORTNESS OF BREATH: 0

## 2023-09-28 ASSESSMENT — VISUAL ACUITY: OU: 1

## 2023-09-28 NOTE — PROGRESS NOTES
Well Adult Note  Name: Magdalena Kapoor Date: 2023   MRN: [id number] Sex: Female   Age: 61 y.o. Ethnicity: Non- / Non    : 1960 Race: White (non-)      Ludmila Rodriguez is here for well adult exam.  History:  Well Adult Physical   Patient here for a comprehensive physical exam.The patient reports problems - see below  Do you take any herbs or supplements that were not prescribed by a doctor? yes Are you taking calcium supplements? no Are you taking aspirin daily? no   History:      Has been unable to get in with pain management because of insurance change   She has been unable to get injection because of her insurance - 4 months overdue   Would like to get pain meds till she is able to get injection - likely after  of the year   Buzzing and ringing in R ear for a couple weeks - pain as well for same duration, no discharge   Shortness of breath once in a while during exertion, she has cut back on smoking, thinks that is causing the dyspnea   Sleeping better with seroquel and vistaril     Review of Systems   Constitutional:  Negative for fatigue, fever and unexpected weight change. Respiratory:  Negative for cough, choking, chest tightness, shortness of breath and wheezing. Cardiovascular:  Negative for chest pain, palpitations and leg swelling. Gastrointestinal:  Negative for abdominal pain, anal bleeding, blood in stool, constipation, diarrhea, nausea and vomiting. Endocrine: Negative. Musculoskeletal:  Positive for arthralgias, back pain and gait problem. Negative for joint swelling and myalgias. Skin: Negative. Neurological:  Negative for dizziness. Psychiatric/Behavioral:  Negative for sleep disturbance. All other systems reviewed and are negative. Allergies   Allergen Reactions    Mushroom Extract Complex          Prior to Visit Medications    Medication Sig Taking?  Authorizing Provider   brimonidine (ALPHAGAN) 0.2 % ophthalmic solution

## 2023-09-28 NOTE — PROGRESS NOTES
Pt is here today for a physical per her insurance    Pt can no longer get her injections in her knees and backs due to a switch In her insurance, all joints are hurting now that the cold weather is setting in       Pt having SOB, but has cut back on smoking,

## 2023-09-29 ENCOUNTER — TELEPHONE (OUTPATIENT)
Dept: FAMILY MEDICINE CLINIC | Age: 63
End: 2023-09-29

## 2023-09-29 LAB
25(OH)D3 SERPL-MCNC: 14.1 NG/ML
ALBUMIN SERPL-MCNC: 4.1 G/DL (ref 3.5–5.2)
ALBUMIN/GLOB SERPL: 1.8 {RATIO} (ref 1–2.5)
ALP SERPL-CCNC: 103 U/L (ref 35–104)
ALT SERPL-CCNC: 11 U/L (ref 5–33)
ANION GAP SERPL CALCULATED.3IONS-SCNC: 9 MMOL/L (ref 9–17)
AST SERPL-CCNC: 14 U/L
BILIRUB SERPL-MCNC: 0.4 MG/DL (ref 0.3–1.2)
BUN SERPL-MCNC: 10 MG/DL (ref 8–23)
CALCIUM SERPL-MCNC: 9.5 MG/DL (ref 8.6–10.4)
CHLORIDE SERPL-SCNC: 96 MMOL/L (ref 98–107)
CHOLEST SERPL-MCNC: 150 MG/DL
CHOLESTEROL/HDL RATIO: 3.1
CO2 SERPL-SCNC: 32 MMOL/L (ref 20–31)
CREAT SERPL-MCNC: 0.7 MG/DL (ref 0.5–0.9)
GFR SERPL CREATININE-BSD FRML MDRD: >60 ML/MIN/1.73M2
GLUCOSE SERPL-MCNC: 82 MG/DL (ref 70–99)
HDLC SERPL-MCNC: 49 MG/DL
LDLC SERPL CALC-MCNC: 80 MG/DL (ref 0–130)
POTASSIUM SERPL-SCNC: 4.1 MMOL/L (ref 3.7–5.3)
PROT SERPL-MCNC: 6.4 G/DL (ref 6.4–8.3)
SODIUM SERPL-SCNC: 137 MMOL/L (ref 135–144)
TRIGL SERPL-MCNC: 107 MG/DL
TSH SERPL DL<=0.05 MIU/L-ACNC: 2.33 UIU/ML (ref 0.3–5)

## 2023-09-29 RX ORDER — TRAMADOL HYDROCHLORIDE 50 MG/1
50 TABLET ORAL EVERY 12 HOURS PRN
Qty: 30 TABLET | Refills: 0 | Status: SHIPPED | OUTPATIENT
Start: 2023-09-29 | End: 2023-10-29

## 2023-09-29 NOTE — TELEPHONE ENCOUNTER
Pt called with the name of the eye drops she is allergic to, it is dorzolamide HCI and timolol, med caused diarrhea       Pain meds sent in for pt at her appt

## 2023-10-01 DIAGNOSIS — G47.9 SLEEPING DIFFICULTY: ICD-10-CM

## 2023-10-02 RX ORDER — QUETIAPINE FUMARATE 50 MG/1
50 TABLET, FILM COATED ORAL NIGHTLY
Qty: 30 TABLET | Refills: 2 | Status: SHIPPED | OUTPATIENT
Start: 2023-10-02

## 2023-10-02 NOTE — TELEPHONE ENCOUNTER
Jannet Mackay is calling to request a refill on the following medication(s):    Medication Request:  Requested Prescriptions     Pending Prescriptions Disp Refills    QUEtiapine (SEROQUEL) 50 MG tablet [Pharmacy Med Name: QUETIAPINE FUMARATE 50 MG TAB] 30 tablet 2     Sig: take 1 tablet by mouth at bedtime       Last Visit Date (If Applicable):  Visit date not found    Next Visit Date:    Visit date not found

## 2023-10-11 NOTE — TELEPHONE ENCOUNTER
Arun Patel MD   10/10/2023 10:04 AM EDT       Stable labs, recheck 1 year.  Continue current medications   Very low vit D, weekly supplement for 12 weeks called in   Please notify       Vitamin D was not sent

## 2023-10-12 RX ORDER — ERGOCALCIFEROL 1.25 MG/1
50000 CAPSULE ORAL WEEKLY
Qty: 12 CAPSULE | Refills: 1 | Status: SHIPPED | OUTPATIENT
Start: 2023-10-12

## 2023-10-22 DIAGNOSIS — I10 ESSENTIAL HYPERTENSION: ICD-10-CM

## 2023-10-23 RX ORDER — ATENOLOL 25 MG/1
25 TABLET ORAL DAILY
Qty: 90 TABLET | Refills: 1 | Status: SHIPPED | OUTPATIENT
Start: 2023-10-23

## 2023-10-26 ENCOUNTER — TELEPHONE (OUTPATIENT)
Dept: FAMILY MEDICINE CLINIC | Age: 63
End: 2023-10-26

## 2023-11-01 DIAGNOSIS — R52 CHRONIC GENERALIZED PAIN: ICD-10-CM

## 2023-11-01 DIAGNOSIS — G89.29 CHRONIC GENERALIZED PAIN: ICD-10-CM

## 2023-11-01 RX ORDER — TRAMADOL HYDROCHLORIDE 50 MG/1
TABLET ORAL
Qty: 30 TABLET | Refills: 0 | Status: SHIPPED | OUTPATIENT
Start: 2023-11-01 | End: 2023-12-01

## 2023-11-01 NOTE — TELEPHONE ENCOUNTER
Edgardo Celeste is calling to request a refill on the following medication(s):    Medication Request:  Requested Prescriptions     Pending Prescriptions Disp Refills    traMADol (ULTRAM) 50 MG tablet [Pharmacy Med Name: TRAMADOL HCL 50 MG TABLET] 30 tablet      Sig: take 1 tablet by mouth every 12 hours AS NEEDED FOR PAIN for up to 30 DAYS MAXIMUM DAILY DOSE OF 2 tablets       Last Visit Date (If Applicable):  9/45/5245    Next Visit Date:    4/1/2024

## 2023-12-06 DIAGNOSIS — R52 CHRONIC GENERALIZED PAIN: ICD-10-CM

## 2023-12-06 DIAGNOSIS — G89.29 CHRONIC GENERALIZED PAIN: ICD-10-CM

## 2023-12-06 RX ORDER — TRAMADOL HYDROCHLORIDE 50 MG/1
50 TABLET ORAL DAILY PRN
Qty: 30 TABLET | Refills: 1 | Status: SHIPPED | OUTPATIENT
Start: 2023-12-06 | End: 2024-02-04

## 2023-12-06 NOTE — TELEPHONE ENCOUNTER
Last Visit:  Visit date not found     Next Visit Date:  Future Appointments   Date Time Provider [address]   4/1/2024  1:45 PM Margaret Barbosa MD [address] Maintenance   Topic Date Due    Cervical cancer screen  Never done    Respiratory Syncytial Virus (RSV) age 61 yrs+ (3 - 1-dose 60+ series) Never done    COVID-19 Vaccine (3 - 2023-24 season) 09/01/2023    Colorectal Cancer Screen  11/06/2023    DTaP/Tdap/Td vaccine (1 - Tdap) 01/24/2024 (Originally 3/24/1979)    Shingles vaccine (1 of 2) 01/24/2024 (Originally 3/24/2010)    Flu vaccine (1) 06/30/2024 (Originally 8/1/2023)    Pneumococcal 0-64 years Vaccine (1 - PCV) 11/15/2024 (Originally 3/24/1966)    Breast cancer screen  01/23/2024    Depression Monitoring  01/24/2024    Lipids  09/28/2024    Hepatitis C screen  Completed    HIV screen  Completed    Hepatitis A vaccine  Aged Out    Hepatitis B vaccine  Aged Out    Hib vaccine  Aged Out    Meningococcal (ACWY) vaccine  Aged Out    A1C test (Diabetic or Prediabetic)  Discontinued    Low dose CT lung screening &/or counseling  Discontinued       Hemoglobin A1C (%)   Date Value   01/24/2023 5.6   10/07/2020 5.7   05/01/2019 5.7             ( goal A1C is < 7)   No components found for: \"LABMICR\"  LDL Cholesterol (mg/dL)   Date Value   09/28/2023 80   01/24/2023 109       (goal LDL is <100)   AST (U/L)   Date Value   09/28/2023 14     ALT (U/L)   Date Value   09/28/2023 11     BUN (mg/dL)   Date Value   09/28/2023 10     BP Readings from Last 3 Encounters:   09/28/23 120/82   04/18/23 (!) 142/80   04/12/23 120/76          (goal 120/80)    All Future Testing planned in [facility]              Patient Active Problem List:     Recurrent major depressive disorder, in partial remission ([address])     Essential hypertension     Hypercholesterolemia     ELY (generalized anxiety disorder)     Jordan-Danlos syndrome     Temporomandibular joint-pain-dysfunction syndrome (TMJ)     Sacroiliac pain

## 2023-12-07 DIAGNOSIS — I10 ESSENTIAL HYPERTENSION: ICD-10-CM

## 2023-12-07 RX ORDER — HYDROCHLOROTHIAZIDE 12.5 MG/1
12.5 CAPSULE, GELATIN COATED ORAL EVERY MORNING
Qty: 90 CAPSULE | Refills: 1 | Status: SHIPPED | OUTPATIENT
Start: 2023-12-07

## 2023-12-07 NOTE — TELEPHONE ENCOUNTER
Last visit: 09/28/2023  Last Med refill: 09/11/2023  Does patient have enough medication for 72 hours: No:     Next Visit Date:  Future Appointments   Date Time Provider Department Center   4/1/2024  1:45 PM Tiffanie Rodriguez MD [facility]       Health Maintenance   Topic Date Due    Cervical cancer screen  Never done    Respiratory Syncytial Virus (RSV) age 60 yrs+ (1 - 1-dose 60+ series) Never done    COVID-19 Vaccine (3 - 2023-24 season) 09/01/2023    Colorectal Cancer Screen  11/06/2023    DTaP/Tdap/Td vaccine (1 - Tdap) 01/24/2024 (Originally 3/24/1979)    Shingles vaccine (1 of 2) 01/24/2024 (Originally 3/24/2010)    Flu vaccine (1) 06/30/2024 (Originally 8/1/2023)    Pneumococcal 0-64 years Vaccine (1 - PCV) 11/15/2024 (Originally 3/24/1966)    Breast cancer screen  01/23/2024    Depression Monitoring  01/24/2024    Lipids  09/28/2024    Hepatitis C screen  Completed    HIV screen  Completed    Hepatitis A vaccine  Aged Out    Hepatitis B vaccine  Aged Out    Hib vaccine  Aged Out    Meningococcal (ACWY) vaccine  Aged Out    A1C test (Diabetic or Prediabetic)  Discontinued    Low dose CT lung screening &/or counseling  Discontinued       Hemoglobin A1C (%)   Date Value   01/24/2023 5.6   10/07/2020 5.7   05/01/2019 5.7             ( goal A1C is < 7)   No components found for: \"LABMICR\"  LDL Cholesterol (mg/dL)   Date Value   09/28/2023 80   01/24/2023 109       (goal LDL is <100)   AST (U/L)   Date Value   09/28/2023 14     ALT (U/L)   Date Value   09/28/2023 11     BUN (mg/dL)   Date Value   09/28/2023 10     BP Readings from Last 3 Encounters:   09/28/23 120/82   04/18/23 (!) 142/80   04/12/23 120/76          (goal 120/80)    All Future Testing planned in [facility]              Patient Active Problem List:     Recurrent major depressive disorder, in partial remission (HCC)     Essential hypertension     Hypercholesterolemia     ELY (generalized anxiety disorder)     Jordan-Danlos syndrome

## 2024-01-10 DIAGNOSIS — E78.00 HYPERCHOLESTEROLEMIA: ICD-10-CM

## 2024-01-10 DIAGNOSIS — G47.9 SLEEPING DIFFICULTY: ICD-10-CM

## 2024-01-10 RX ORDER — QUETIAPINE FUMARATE 50 MG/1
50 TABLET, FILM COATED ORAL NIGHTLY
Qty: 30 TABLET | Refills: 2 | Status: SHIPPED | OUTPATIENT
Start: 2024-01-10

## 2024-01-10 RX ORDER — SIMVASTATIN 40 MG
TABLET ORAL
Qty: 90 TABLET | Refills: 1 | Status: SHIPPED | OUTPATIENT
Start: 2024-01-10

## 2024-01-10 NOTE — TELEPHONE ENCOUNTER
Natalie Solomon is calling to request a refill on the following medication(s):    Last Visit Date (If Applicable):  9/28/2023    Next Visit Date:    1/11/2024    Medication Request:  Requested Prescriptions     Pending Prescriptions Disp Refills    QUEtiapine (SEROQUEL) 50 MG tablet [Pharmacy Med Name: QUETIAPINE FUMARATE 50 MG TAB] 30 tablet 2     Sig: take 1 tablet by mouth at bedtime    simvastatin (ZOCOR) 40 MG tablet [Pharmacy Med Name: SIMVASTATIN 40 MG TABLET] 90 tablet 1     Sig: take 1 tablet by mouth once daily

## 2024-01-11 ENCOUNTER — OFFICE VISIT (OUTPATIENT)
Dept: FAMILY MEDICINE CLINIC | Age: 64
End: 2024-01-11
Payer: COMMERCIAL

## 2024-01-11 VITALS
HEIGHT: 62 IN | TEMPERATURE: 97.3 F | SYSTOLIC BLOOD PRESSURE: 120 MMHG | DIASTOLIC BLOOD PRESSURE: 82 MMHG | BODY MASS INDEX: 26.79 KG/M2 | WEIGHT: 145.6 LBS | HEART RATE: 75 BPM | OXYGEN SATURATION: 96 %

## 2024-01-11 DIAGNOSIS — J01.90 ACUTE BACTERIAL SINUSITIS: Primary | ICD-10-CM

## 2024-01-11 DIAGNOSIS — B96.89 ACUTE BACTERIAL SINUSITIS: Primary | ICD-10-CM

## 2024-01-11 PROCEDURE — 3074F SYST BP LT 130 MM HG: CPT | Performed by: INTERNAL MEDICINE

## 2024-01-11 PROCEDURE — 3017F COLORECTAL CA SCREEN DOC REV: CPT | Performed by: INTERNAL MEDICINE

## 2024-01-11 PROCEDURE — G8417 CALC BMI ABV UP PARAM F/U: HCPCS | Performed by: INTERNAL MEDICINE

## 2024-01-11 PROCEDURE — 99214 OFFICE O/P EST MOD 30 MIN: CPT | Performed by: INTERNAL MEDICINE

## 2024-01-11 PROCEDURE — G8427 DOCREV CUR MEDS BY ELIG CLIN: HCPCS | Performed by: INTERNAL MEDICINE

## 2024-01-11 PROCEDURE — 4004F PT TOBACCO SCREEN RCVD TLK: CPT | Performed by: INTERNAL MEDICINE

## 2024-01-11 PROCEDURE — G8484 FLU IMMUNIZE NO ADMIN: HCPCS | Performed by: INTERNAL MEDICINE

## 2024-01-11 PROCEDURE — 3079F DIAST BP 80-89 MM HG: CPT | Performed by: INTERNAL MEDICINE

## 2024-01-11 RX ORDER — DOXYCYCLINE HYCLATE 100 MG
100 TABLET ORAL 2 TIMES DAILY
Qty: 20 TABLET | Refills: 0 | Status: SHIPPED | OUTPATIENT
Start: 2024-01-11 | End: 2024-01-21

## 2024-01-11 ASSESSMENT — PATIENT HEALTH QUESTIONNAIRE - PHQ9
8. MOVING OR SPEAKING SO SLOWLY THAT OTHER PEOPLE COULD HAVE NOTICED. OR THE OPPOSITE, BEING SO FIGETY OR RESTLESS THAT YOU HAVE BEEN MOVING AROUND A LOT MORE THAN USUAL: 0
SUM OF ALL RESPONSES TO PHQ QUESTIONS 1-9: 0
1. LITTLE INTEREST OR PLEASURE IN DOING THINGS: 0
2. FEELING DOWN, DEPRESSED OR HOPELESS: 0
9. THOUGHTS THAT YOU WOULD BE BETTER OFF DEAD, OR OF HURTING YOURSELF: 0
SUM OF ALL RESPONSES TO PHQ QUESTIONS 1-9: 0
7. TROUBLE CONCENTRATING ON THINGS, SUCH AS READING THE NEWSPAPER OR WATCHING TELEVISION: 0
10. IF YOU CHECKED OFF ANY PROBLEMS, HOW DIFFICULT HAVE THESE PROBLEMS MADE IT FOR YOU TO DO YOUR WORK, TAKE CARE OF THINGS AT HOME, OR GET ALONG WITH OTHER PEOPLE: 0
5. POOR APPETITE OR OVEREATING: 0
SUM OF ALL RESPONSES TO PHQ9 QUESTIONS 1 & 2: 0
6. FEELING BAD ABOUT YOURSELF - OR THAT YOU ARE A FAILURE OR HAVE LET YOURSELF OR YOUR FAMILY DOWN: 0
3. TROUBLE FALLING OR STAYING ASLEEP: 0
SUM OF ALL RESPONSES TO PHQ QUESTIONS 1-9: 0
SUM OF ALL RESPONSES TO PHQ QUESTIONS 1-9: 0
4. FEELING TIRED OR HAVING LITTLE ENERGY: 0

## 2024-01-11 ASSESSMENT — ENCOUNTER SYMPTOMS
VOMITING: 0
SWOLLEN GLANDS: 0
CHEST TIGHTNESS: 0
SINUS COMPLAINT: 1
SINUS PRESSURE: 1
CHOKING: 0
SHORTNESS OF BREATH: 0
RHINORRHEA: 1
DIARRHEA: 0
SINUS PAIN: 0
NAUSEA: 0
HOARSE VOICE: 0
SORE THROAT: 0
COUGH: 1
EYES NEGATIVE: 1

## 2024-01-11 ASSESSMENT — VISUAL ACUITY: OU: 1

## 2024-01-11 NOTE — PROGRESS NOTES
[facility]  [facility]  [address] [address]  Dept: [phone]  Dept Fax: [phone]      Natalie Solomon is a 63 y.o. female who presents today for hermedical conditions/complaints as noted below.  Natalie Solomon is c/o of Tinnitus (Ringing in both ears, gets louder throughout the day, been going on since 2023, does get dizzy and light headed ), Sinus Problem (Some sinus pressure in forehead, pt has cut back on smoking, pt feels dehydrated, mouth gets very dry ), and Cough (Coughs up phlegm, but feels stuck in her throat most the time /Has not had a fever )        Assessment/Plan:     1. Acute bacterial sinusitis  -     doxycycline hyclate (VIBRA-TABS) 100 MG tablet; Take 1 tablet by mouth 2 times daily for 10 days, Disp-20 tablet, R-0Normal          No follow-ups on file.      HPI     Sinus Problem  This is a recurrent problem. The current episode started more than 1 month ago. The problem has been waxing and waning since onset. There has been no fever. Associated symptoms include coughing (intermittently productive) and sinus pressure. Pertinent negatives include no chills, congestion, diaphoresis, ear pain, headaches, hoarse voice, neck pain, shortness of breath, sneezing, sore throat or swollen glands. Treatments tried: mucinex. Improvement on treatment: helped initially but not any more.       BP Readings from Last 3 Encounters:   24 120/82   23 120/82   23 (!) 142/80              Past Medical History:   Diagnosis Date    Anxiety and depression     Arthritis     Chronic back pain     Degenerative joint disease     Diarrhea     Hyperlipidemia     Osteopenia       Past Surgical History:   Procedure Laterality Date    CERVICAL FUSION       SECTION      x 2    COLONOSCOPY      CYST REMOVAL Left     second finger    EYE SURGERY Bilateral     lasik    NASAL SEPTUM SURGERY Bilateral     OTHER SURGICAL HISTORY  2/23/15    exc. lt

## 2024-02-19 DIAGNOSIS — F41.1 GAD (GENERALIZED ANXIETY DISORDER): ICD-10-CM

## 2024-02-20 RX ORDER — HYDROXYZINE PAMOATE 50 MG/1
CAPSULE ORAL
Qty: 180 CAPSULE | Refills: 1 | Status: SHIPPED | OUTPATIENT
Start: 2024-02-20

## 2024-03-13 DIAGNOSIS — G89.29 CHRONIC GENERALIZED PAIN: ICD-10-CM

## 2024-03-13 DIAGNOSIS — R52 CHRONIC GENERALIZED PAIN: ICD-10-CM

## 2024-03-14 RX ORDER — TRAMADOL HYDROCHLORIDE 50 MG/1
50 TABLET ORAL DAILY PRN
Qty: 30 TABLET | OUTPATIENT
Start: 2024-03-14 | End: 2024-05-13

## 2024-03-14 NOTE — TELEPHONE ENCOUNTER
Last visit: 01/11/2024  Last Med refill: 01/24/2024  Does patient have enough medication for 72 hours: No:     Next Visit Date:  Future Appointments   Date Time Provider Department Center   4/1/2024  1:45 PM Tiffanie Rodriguez MD [facility]       Health Maintenance   Topic Date Due    DTaP/Tdap/Td vaccine (1 - Tdap) Never done    Cervical cancer screen  Never done    Shingles vaccine (1 of 2) Never done    Respiratory Syncytial Virus (RSV) Pregnant or age 60 yrs+ (1 - 1-dose 60+ series) Never done    COVID-19 Vaccine (3 - 2023-24 season) 09/01/2023    Colorectal Cancer Screen  11/06/2023    Breast cancer screen  01/23/2024    Flu vaccine (1) 06/30/2024 (Originally 8/1/2023)    Pneumococcal 0-64 years Vaccine (1 - PCV) 11/15/2024 (Originally [birth year]/24/1966)    Lipids  09/28/2024    Depression Monitoring  01/11/2025    Hepatitis C screen  Completed    HIV screen  Completed    Hepatitis A vaccine  Aged Out    Hepatitis B vaccine  Aged Out    Hib vaccine  Aged Out    Polio vaccine  Aged Out    Meningococcal (ACWY) vaccine  Aged Out    A1C test (Diabetic or Prediabetic)  Discontinued    Low dose CT lung screening &/or counseling  Discontinued       Hemoglobin A1C (%)   Date Value   01/24/2023 5.6   10/07/2020 5.7   05/01/2019 5.7             ( goal A1C is < 7)   No components found for: \"LABMICR\"  LDL Cholesterol (mg/dL)   Date Value   09/28/2023 80   01/24/2023 109       (goal LDL is <100)   AST (U/L)   Date Value   09/28/2023 14     ALT (U/L)   Date Value   09/28/2023 11     BUN (mg/dL)   Date Value   09/28/2023 10     BP Readings from Last 3 Encounters:   01/11/24 120/82   09/28/23 120/82   04/18/23 (!) 142/80          (goal 120/80)    All Future Testing planned in [facility]              Patient Active Problem List:     Recurrent major depressive disorder, in partial remission (HCC)     Essential hypertension     Hypercholesterolemia     ELY (generalized anxiety disorder)     Jordan-Danlos syndrome

## 2024-03-14 NOTE — TELEPHONE ENCOUNTER
Pt was given percocet from orthopedics last month - 50 tablets. This is a violation of medication agreement.

## 2024-04-08 RX ORDER — ERGOCALCIFEROL 1.25 MG/1
50000 CAPSULE ORAL WEEKLY
Qty: 12 CAPSULE | Refills: 1 | Status: SHIPPED | OUTPATIENT
Start: 2024-04-08

## 2024-04-08 NOTE — TELEPHONE ENCOUNTER
Last visit: 1/11/24  Last Med refill: 10/12/23  Does patient have enough medication for 72 hours: Yes    Next Visit Date:  Future Appointments   Date Time Provider Department Center   4/24/2024 12:00 PM Tiffanie Rodriguez MD [facility]       Health Maintenance   Topic Date Due    DTaP/Tdap/Td vaccine (1 - Tdap) Never done    Cervical cancer screen  Never done    Shingles vaccine (1 of 2) Never done    Respiratory Syncytial Virus (RSV) Pregnant or age 60 yrs+ (1 - 1-dose 60+ series) Never done    COVID-19 Vaccine (3 - 2023-24 season) 09/01/2023    Colorectal Cancer Screen  11/06/2023    Breast cancer screen  01/23/2024    Pneumococcal 0-64 years Vaccine (1 of 2 - PCV) 11/15/2024 (Originally 3/24/1966)    Flu vaccine (Season Ended) 08/01/2024    Lipids  09/28/2024    Depression Monitoring  01/11/2025    Hepatitis C screen  Completed    HIV screen  Completed    Hepatitis A vaccine  Aged Out    Hepatitis B vaccine  Aged Out    Hib vaccine  Aged Out    Polio vaccine  Aged Out    Meningococcal (ACWY) vaccine  Aged Out    A1C test (Diabetic or Prediabetic)  Discontinued    Low dose CT lung screening &/or counseling  Discontinued       Hemoglobin A1C (%)   Date Value   01/24/2023 5.6   10/07/2020 5.7   05/01/2019 5.7             ( goal A1C is < 7)   No components found for: \"LABMICR\"  LDL Cholesterol (mg/dL)   Date Value   09/28/2023 80   01/24/2023 109       (goal LDL is <100)   AST (U/L)   Date Value   09/28/2023 14     ALT (U/L)   Date Value   09/28/2023 11     BUN (mg/dL)   Date Value   09/28/2023 10     BP Readings from Last 3 Encounters:   01/11/24 120/82   09/28/23 120/82   04/18/23 (!) 142/80          (goal 120/80)    All Future Testing planned in [facility]              Patient Active Problem List:     Recurrent major depressive disorder, in partial remission (HCC)     Essential hypertension     Hypercholesterolemia     ELY (generalized anxiety disorder)     Jordan-Danlos syndrome     Temporomandibular

## 2024-04-10 DIAGNOSIS — G47.9 SLEEPING DIFFICULTY: ICD-10-CM

## 2024-04-10 RX ORDER — QUETIAPINE FUMARATE 50 MG/1
50 TABLET, FILM COATED ORAL NIGHTLY
Qty: 30 TABLET | Refills: 2 | Status: SHIPPED | OUTPATIENT
Start: 2024-04-10

## 2024-04-10 NOTE — TELEPHONE ENCOUNTER
Last visit: 1/11/24  Last Med refill: 1/10/24  Does patient have enough medication for 72 hours: No:     Next Visit Date:  Future Appointments   Date Time Provider Department Center   4/24/2024 12:00 PM [facility]       Health Maintenance   Topic Date Due    DTaP/Tdap/Td vaccine (1 - Tdap) Never done    Cervical cancer screen  Never done    Shingles vaccine (1 of 2) Never done    Respiratory Syncytial Virus (RSV) Pregnant or age 60 yrs+ (1 - 1-dose 60+ series) Never done    COVID-19 Vaccine (3 - 2023-24 season) 09/01/2023    Colorectal Cancer Screen  11/06/2023    Breast cancer screen  01/23/2024    Pneumococcal 0-64 years Vaccine (1 of 2 - PCV) 11/15/2024 (Originally [birth year]/24/1966)    Flu vaccine (Season Ended) 08/01/2024    Lipids  09/28/2024    Depression Monitoring  01/11/2025    Hepatitis C screen  Completed    HIV screen  Completed    Hepatitis A vaccine  Aged Out    Hepatitis B vaccine  Aged Out    Hib vaccine  Aged Out    Polio vaccine  Aged Out    Meningococcal (ACWY) vaccine  Aged Out    A1C test (Diabetic or Prediabetic)  Discontinued    Low dose CT lung screening &/or counseling  Discontinued       Hemoglobin A1C (%)   Date Value   01/24/2023 5.6   10/07/2020 5.7   05/01/2019 5.7             ( goal A1C is < 7)   No components found for: \"LABMICR\"  LDL Cholesterol (mg/dL)   Date Value   09/28/2023 80   01/24/2023 109       (goal LDL is <100)   AST (U/L)   Date Value   09/28/2023 14     ALT (U/L)   Date Value   09/28/2023 11     BUN (mg/dL)   Date Value   09/28/2023 10     BP Readings from Last 3 Encounters:   01/11/24 120/82   09/28/23 120/82   04/18/23 (!) 142/80          (goal 120/80)    All Future Testing planned in [facility]              Patient Active Problem List:     Recurrent major depressive disorder, in partial remission (HCC)     Essential hypertension     Hypercholesterolemia     ELY (generalized anxiety disorder)     Jordan-Danlos syndrome     Temporomandibular

## 2024-04-16 DIAGNOSIS — I10 ESSENTIAL HYPERTENSION: ICD-10-CM

## 2024-04-16 RX ORDER — ATENOLOL 25 MG/1
25 TABLET ORAL DAILY
Qty: 90 TABLET | Refills: 1 | Status: SHIPPED | OUTPATIENT
Start: 2024-04-16

## 2024-04-16 NOTE — TELEPHONE ENCOUNTER
Last visit: 1/11/24  Last Med refill: 10/23/23  Does patient have enough medication for 72 hours: Yes    Next Visit Date:  Future Appointments   Date Time Provider Department Center   4/24/2024 12:00 PM Tiffanie Rodriguez MD [facility]       Health Maintenance   Topic Date Due    DTaP/Tdap/Td vaccine (1 - Tdap) Never done    Cervical cancer screen  Never done    Shingles vaccine (1 of 2) Never done    Respiratory Syncytial Virus (RSV) Pregnant or age 60 yrs+ (1 - 1-dose 60+ series) Never done    COVID-19 Vaccine (3 - 2023-24 season) 09/01/2023    Colorectal Cancer Screen  11/06/2023    Breast cancer screen  01/23/2024    Pneumococcal 0-64 years Vaccine (1 of 2 - PCV) 11/15/2024 (Originally 3/24/1966)    Flu vaccine (Season Ended) 08/01/2024    Lipids  09/28/2024    Depression Monitoring  01/11/2025    Hepatitis C screen  Completed    HIV screen  Completed    Hepatitis A vaccine  Aged Out    Hepatitis B vaccine  Aged Out    Hib vaccine  Aged Out    Polio vaccine  Aged Out    Meningococcal (ACWY) vaccine  Aged Out    A1C test (Diabetic or Prediabetic)  Discontinued    Low dose CT lung screening &/or counseling  Discontinued       Hemoglobin A1C (%)   Date Value   01/24/2023 5.6   10/07/2020 5.7   05/01/2019 5.7             ( goal A1C is < 7)   No components found for: \"LABMICR\"  LDL Cholesterol (mg/dL)   Date Value   09/28/2023 80   01/24/2023 109       (goal LDL is <100)   AST (U/L)   Date Value   09/28/2023 14     ALT (U/L)   Date Value   09/28/2023 11     BUN (mg/dL)   Date Value   09/28/2023 10     BP Readings from Last 3 Encounters:   01/11/24 120/82   09/28/23 120/82   04/18/23 (!) 142/80          (goal 120/80)    All Future Testing planned in [facility]              Patient Active Problem List:     Recurrent major depressive disorder, in partial remission (HCC)     Essential hypertension     Hypercholesterolemia     ELY (generalized anxiety disorder)     Jordan-Danlos syndrome     Temporomandibular

## 2024-04-25 ENCOUNTER — OFFICE VISIT (OUTPATIENT)
Dept: FAMILY MEDICINE CLINIC | Age: 64
End: 2024-04-25
Payer: COMMERCIAL

## 2024-04-25 VITALS
OXYGEN SATURATION: 93 % | BODY MASS INDEX: 26.34 KG/M2 | HEART RATE: 70 BPM | SYSTOLIC BLOOD PRESSURE: 122 MMHG | WEIGHT: 144 LBS | DIASTOLIC BLOOD PRESSURE: 80 MMHG

## 2024-04-25 DIAGNOSIS — Z72.0 TOBACCO ABUSE: ICD-10-CM

## 2024-04-25 DIAGNOSIS — G89.29 CHRONIC GENERALIZED PAIN: ICD-10-CM

## 2024-04-25 DIAGNOSIS — Q79.60 EHLERS-DANLOS SYNDROME: ICD-10-CM

## 2024-04-25 DIAGNOSIS — F33.41 RECURRENT MAJOR DEPRESSIVE DISORDER, IN PARTIAL REMISSION (HCC): ICD-10-CM

## 2024-04-25 DIAGNOSIS — E78.00 HYPERCHOLESTEROLEMIA: ICD-10-CM

## 2024-04-25 DIAGNOSIS — Z12.11 COLON CANCER SCREENING: ICD-10-CM

## 2024-04-25 DIAGNOSIS — I10 ESSENTIAL HYPERTENSION: ICD-10-CM

## 2024-04-25 DIAGNOSIS — R52 CHRONIC GENERALIZED PAIN: ICD-10-CM

## 2024-04-25 DIAGNOSIS — F41.1 GAD (GENERALIZED ANXIETY DISORDER): ICD-10-CM

## 2024-04-25 DIAGNOSIS — M53.3 SACROILIAC PAIN: ICD-10-CM

## 2024-04-25 DIAGNOSIS — H93.13 TINNITUS AURIUM, BILATERAL: Primary | ICD-10-CM

## 2024-04-25 PROCEDURE — 99214 OFFICE O/P EST MOD 30 MIN: CPT | Performed by: INTERNAL MEDICINE

## 2024-04-25 PROCEDURE — 3079F DIAST BP 80-89 MM HG: CPT | Performed by: INTERNAL MEDICINE

## 2024-04-25 PROCEDURE — 3074F SYST BP LT 130 MM HG: CPT | Performed by: INTERNAL MEDICINE

## 2024-04-25 RX ORDER — TRAMADOL HYDROCHLORIDE 50 MG/1
50 TABLET ORAL DAILY PRN
Qty: 30 TABLET | Refills: 1 | Status: SHIPPED | OUTPATIENT
Start: 2024-04-25 | End: 2024-06-24

## 2024-04-25 ASSESSMENT — ENCOUNTER SYMPTOMS
VOMITING: 0
BLOOD IN STOOL: 0
ABDOMINAL PAIN: 0
SHORTNESS OF BREATH: 0
NAUSEA: 0
ANAL BLEEDING: 0
CHEST TIGHTNESS: 0
WHEEZING: 0
COUGH: 0
CONSTIPATION: 0
CHOKING: 0
DIARRHEA: 0

## 2024-04-25 ASSESSMENT — VISUAL ACUITY: OU: 1

## 2024-04-25 NOTE — PROGRESS NOTES
Patient gave bottle with 31 tabs of Oxycodone 5-325 mg to be destroyed. She states is to strong and will not be taking.  I counted and placed in sharps container in room 2 .  
(ACWY) vaccine  Aged Out    A1C test (Diabetic or Prediabetic)  Discontinued    Low dose CT lung screening &/or counseling  Discontinued          Review of Systems   Constitutional:  Negative for fatigue, fever, unexpected weight change and weight gain.   Respiratory:  Negative for cough, choking, chest tightness, shortness of breath and wheezing.    Cardiovascular:  Negative for chest pain, palpitations and leg swelling.   Gastrointestinal:  Negative for abdominal pain, anal bleeding, blood in stool, constipation, diarrhea, nausea and vomiting.   Endocrine: Negative.    Musculoskeletal:  Negative for joint swelling, myalgias and muscle weakness.   Skin: Negative.    Neurological:  Negative for dizziness.   Psychiatric/Behavioral:  Negative for sleep disturbance.    All other systems reviewed and are negative.      Objective:     /80 (Site: Left Upper Arm, Position: Sitting, Cuff Size: Medium Adult)   Pulse 70   Wt 65.3 kg (144 lb)   SpO2 93%   BMI 26.34 kg/m²   Physical Exam  Vitals and nursing note reviewed.   Constitutional:       General: She is not in acute distress.     Appearance: She is well-developed. She is not ill-appearing.   Eyes:      General: Lids are normal. Vision grossly intact.      Comments: R pupil dilated, baseline   No vision R eye - baseline    Cardiovascular:      Rate and Rhythm: Normal rate and regular rhythm.      Heart sounds: Normal heart sounds, S1 normal and S2 normal. No murmur heard.     No friction rub. No gallop.   Pulmonary:      Effort: Pulmonary effort is normal. No respiratory distress.      Breath sounds: Normal breath sounds. No wheezing.   Abdominal:      General: Bowel sounds are normal.      Palpations: Abdomen is soft. There is no mass.      Tenderness: There is no abdominal tenderness. There is no guarding.   Musculoskeletal:         General: Normal range of motion.   Skin:     General: Skin is warm and dry.      Capillary Refill: Capillary refill takes less

## 2024-05-10 LAB — NONINV COLON CA DNA+OCC BLD SCRN STL QL: POSITIVE

## 2024-05-14 DIAGNOSIS — R19.5 POSITIVE COLORECTAL CANCER SCREENING USING COLOGUARD TEST: Primary | ICD-10-CM

## 2024-06-09 DIAGNOSIS — I10 ESSENTIAL HYPERTENSION: ICD-10-CM

## 2024-06-10 RX ORDER — HYDROCHLOROTHIAZIDE 12.5 MG/1
12.5 CAPSULE, GELATIN COATED ORAL EVERY MORNING
Qty: 90 CAPSULE | Refills: 1 | Status: SHIPPED | OUTPATIENT
Start: 2024-06-10

## 2024-06-10 NOTE — TELEPHONE ENCOUNTER
Last visit: 04/25/2024  Last Med refill: 03/10/2024  Does patient have enough medication for 72 hours: No:     Next Visit Date:  Future Appointments   Date Time Provider Department Center   10/28/2024  1:00 PM Tiffanie Rodriguez MD [facility]       Health Maintenance   Topic Date Due    DTaP/Tdap/Td vaccine (1 - Tdap) Never done    Cervical cancer screen  Never done    Shingles vaccine (1 of 2) Never done    Pneumococcal 0-64 years Vaccine (1 of 2 - PCV) 11/15/2024 (Originally 3/24/1966)    COVID-19 Vaccine (3 - 2023-24 season) 04/25/2025 (Originally 9/1/2023)    Respiratory Syncytial Virus (RSV) Pregnant or age 60 yrs+ (1 - 1-dose 60+ series) 04/25/2025 (Originally 3/24/2020)    Flu vaccine (Season Ended) 08/01/2024    Lipids  09/28/2024    Depression Monitoring  01/11/2025    Breast cancer screen  04/25/2026    Colorectal Cancer Screen  05/03/2027    Hepatitis C screen  Completed    HIV screen  Completed    Hepatitis A vaccine  Aged Out    Hepatitis B vaccine  Aged Out    Hib vaccine  Aged Out    Polio vaccine  Aged Out    Meningococcal (ACWY) vaccine  Aged Out    A1C test (Diabetic or Prediabetic)  Discontinued    Low dose CT lung screening &/or counseling  Discontinued       Hemoglobin A1C (%)   Date Value   01/24/2023 5.6   10/07/2020 5.7   05/01/2019 5.7             ( goal A1C is < 7)   No components found for: \"LABMICR\"  No components found for: \"LDLCHOLESTEROL\", \"LDLCALC\"    (goal LDL is <100)   AST (U/L)   Date Value   09/28/2023 14     ALT (U/L)   Date Value   09/28/2023 11     BUN (mg/dL)   Date Value   09/28/2023 10     BP Readings from Last 3 Encounters:   04/25/24 122/80   01/11/24 120/82   09/28/23 120/82          (goal 120/80)    All Future Testing planned in [facility]              Patient Active Problem List:     Recurrent major depressive disorder, in partial remission (HCC)     Essential hypertension     Hypercholesterolemia     ELY (generalized anxiety disorder)     Berry

## 2024-06-27 DIAGNOSIS — R52 CHRONIC GENERALIZED PAIN: ICD-10-CM

## 2024-06-27 DIAGNOSIS — G89.29 CHRONIC GENERALIZED PAIN: ICD-10-CM

## 2024-06-27 RX ORDER — TRAMADOL HYDROCHLORIDE 50 MG/1
50 TABLET ORAL DAILY PRN
Qty: 15 TABLET | Refills: 0 | Status: SHIPPED | OUTPATIENT
Start: 2024-06-27 | End: 2024-07-27

## 2024-06-27 NOTE — TELEPHONE ENCOUNTER
Patient called and stated her [facility] is closing and needs her Tramadol sent to [facility] [address].    Pharmacy updated, unable to pend med  
 Temporomandibular joint-pain-dysfunction syndrome (TMJ)     Sacroiliac pain     Chronic generalized pain     Tobacco abuse

## 2024-07-22 DIAGNOSIS — G47.9 SLEEPING DIFFICULTY: ICD-10-CM

## 2024-07-22 DIAGNOSIS — I10 ESSENTIAL HYPERTENSION: ICD-10-CM

## 2024-07-22 DIAGNOSIS — K21.9 GASTROESOPHAGEAL REFLUX DISEASE, UNSPECIFIED WHETHER ESOPHAGITIS PRESENT: ICD-10-CM

## 2024-07-22 DIAGNOSIS — E78.00 HYPERCHOLESTEROLEMIA: ICD-10-CM

## 2024-07-22 RX ORDER — ATENOLOL 25 MG/1
25 TABLET ORAL DAILY
Qty: 90 TABLET | Refills: 0 | Status: SHIPPED | OUTPATIENT
Start: 2024-07-22

## 2024-07-22 RX ORDER — QUETIAPINE FUMARATE 50 MG/1
50 TABLET, FILM COATED ORAL NIGHTLY
Qty: 90 TABLET | Refills: 0 | Status: SHIPPED | OUTPATIENT
Start: 2024-07-22

## 2024-07-22 RX ORDER — SIMVASTATIN 40 MG
40 TABLET ORAL DAILY
Qty: 90 TABLET | Refills: 0 | Status: SHIPPED | OUTPATIENT
Start: 2024-07-22

## 2024-07-22 NOTE — TELEPHONE ENCOUNTER
Jordan-Danlos syndrome     Temporomandibular joint-pain-dysfunction syndrome (TMJ)     Sacroiliac pain     Chronic generalized pain     Tobacco abuse

## 2024-08-09 DIAGNOSIS — B96.89 ACUTE BACTERIAL SINUSITIS: ICD-10-CM

## 2024-08-09 DIAGNOSIS — R52 CHRONIC GENERALIZED PAIN: ICD-10-CM

## 2024-08-09 DIAGNOSIS — F41.1 GAD (GENERALIZED ANXIETY DISORDER): ICD-10-CM

## 2024-08-09 DIAGNOSIS — J01.90 ACUTE BACTERIAL SINUSITIS: ICD-10-CM

## 2024-08-09 DIAGNOSIS — G89.29 CHRONIC GENERALIZED PAIN: ICD-10-CM

## 2024-08-09 NOTE — TELEPHONE ENCOUNTER
Last visit: 04/25/2024  Last Med refill: 04/24/2024 02/20/2004  Does patient have enough medication for 72 hours: Yes    Next Visit Date:  Future Appointments   Date Time Provider Department Center   10/28/2024  1:00 PM Tiffanie Rodriguez MD [facility]       Health Maintenance   Topic Date Due    DTaP/Tdap/Td vaccine (1 - Tdap) Never done    Cervical cancer screen  Never done    Shingles vaccine (1 of 2) Never done    Flu vaccine (1) Never done    Pneumococcal 0-64 years Vaccine (1 of 2 - PCV) 11/15/2024 (Originally 3/24/1966)    COVID-19 Vaccine (3 - 2023-24 season) 04/25/2025 (Originally 9/1/2023)    Respiratory Syncytial Virus (RSV) Pregnant or age 60 yrs+ (1 - 1-dose 60+ series) 04/25/2025 (Originally 3/24/2020)    Lipids  09/28/2024    Depression Monitoring  01/11/2025    Breast cancer screen  04/25/2026    Colorectal Cancer Screen  07/25/2029    Hepatitis C screen  Completed    HIV screen  Completed    Hepatitis A vaccine  Aged Out    Hepatitis B vaccine  Aged Out    Hib vaccine  Aged Out    Polio vaccine  Aged Out    Meningococcal (ACWY) vaccine  Aged Out    A1C test (Diabetic or Prediabetic)  Discontinued    Lung Cancer Screening &/or Counseling  Discontinued       Hemoglobin A1C (%)   Date Value   01/24/2023 5.6   10/07/2020 5.7   05/01/2019 5.7             ( goal A1C is < 7)   No components found for: \"LABMICR\"  No components found for: \"LDLCHOLESTEROL\", \"LDLCALC\"    (goal LDL is <100)   AST (U/L)   Date Value   09/28/2023 14     ALT (U/L)   Date Value   09/28/2023 11     BUN (mg/dL)   Date Value   09/28/2023 10     BP Readings from Last 3 Encounters:   04/25/24 122/80   01/11/24 120/82   09/28/23 120/82          (goal 120/80)    All Future Testing planned in [facility]              Patient Active Problem List:     Recurrent major depressive disorder, in partial remission (HCC)     Essential hypertension     Hypercholesterolemia     ELY (generalized anxiety disorder)     Berry

## 2024-08-12 RX ORDER — FLUTICASONE PROPIONATE 50 MCG
1 SPRAY, SUSPENSION (ML) NASAL DAILY
Qty: 16 G | Refills: 2 | Status: SHIPPED | OUTPATIENT
Start: 2024-08-12

## 2024-08-12 RX ORDER — TRAMADOL HYDROCHLORIDE 50 MG/1
50 TABLET ORAL DAILY PRN
Qty: 15 TABLET | Refills: 0 | Status: SHIPPED | OUTPATIENT
Start: 2024-08-12 | End: 2024-09-11

## 2024-08-12 RX ORDER — HYDROXYZINE PAMOATE 50 MG/1
CAPSULE ORAL
Qty: 180 CAPSULE | Refills: 1 | Status: SHIPPED | OUTPATIENT
Start: 2024-08-12

## 2024-09-03 DIAGNOSIS — G89.29 CHRONIC GENERALIZED PAIN: ICD-10-CM

## 2024-09-03 DIAGNOSIS — I10 ESSENTIAL HYPERTENSION: ICD-10-CM

## 2024-09-03 DIAGNOSIS — R52 CHRONIC GENERALIZED PAIN: ICD-10-CM

## 2024-09-03 NOTE — TELEPHONE ENCOUNTER
Patient called asking for refills on:  -HCTZ  -Vitamin D  -Tramadol    Sami Carey.      Patient asked why she was  only given 15 day supply of tramadol instead of 30

## 2024-09-04 RX ORDER — HYDROCHLOROTHIAZIDE 12.5 MG/1
12.5 CAPSULE ORAL EVERY MORNING
Qty: 90 CAPSULE | Refills: 1 | Status: SHIPPED | OUTPATIENT
Start: 2024-09-04

## 2024-09-04 RX ORDER — ERGOCALCIFEROL 1.25 MG/1
50000 CAPSULE, LIQUID FILLED ORAL WEEKLY
Qty: 12 CAPSULE | Refills: 1 | Status: SHIPPED | OUTPATIENT
Start: 2024-09-04

## 2024-09-04 RX ORDER — TRAMADOL HYDROCHLORIDE 50 MG/1
50 TABLET ORAL DAILY PRN
Qty: 30 TABLET | Refills: 0 | Status: SHIPPED | OUTPATIENT
Start: 2024-09-04 | End: 2024-10-04

## 2024-10-10 DIAGNOSIS — R52 CHRONIC GENERALIZED PAIN: ICD-10-CM

## 2024-10-10 DIAGNOSIS — Z02.89 MEDICATION MANAGEMENT CONTRACT AGREEMENT: Primary | ICD-10-CM

## 2024-10-10 DIAGNOSIS — I10 ESSENTIAL HYPERTENSION: ICD-10-CM

## 2024-10-10 DIAGNOSIS — G89.29 CHRONIC GENERALIZED PAIN: ICD-10-CM

## 2024-10-10 DIAGNOSIS — G47.9 SLEEPING DIFFICULTY: ICD-10-CM

## 2024-10-11 RX ORDER — TRAMADOL HYDROCHLORIDE 50 MG/1
TABLET ORAL
Qty: 30 TABLET | Refills: 0 | OUTPATIENT
Start: 2024-10-11

## 2024-10-11 RX ORDER — QUETIAPINE FUMARATE 50 MG/1
50 TABLET, FILM COATED ORAL NIGHTLY
Qty: 90 TABLET | Refills: 0 | Status: SHIPPED | OUTPATIENT
Start: 2024-10-11

## 2024-10-11 RX ORDER — TRAMADOL HYDROCHLORIDE 50 MG/1
50 TABLET ORAL DAILY PRN
Qty: 30 TABLET | Refills: 0 | Status: CANCELLED | OUTPATIENT
Start: 2024-10-11 | End: 2024-11-10

## 2024-10-11 RX ORDER — ATENOLOL 25 MG/1
25 TABLET ORAL DAILY
Qty: 90 TABLET | Refills: 0 | Status: SHIPPED | OUTPATIENT
Start: 2024-10-11

## 2024-10-11 NOTE — TELEPHONE ENCOUNTER
Looks like tramadol is being requested monthly. Needs to come in for drug screen today and to sign medication contract for us to send in refill. KEESHA ordered.

## 2024-10-11 NOTE — TELEPHONE ENCOUNTER
Last visit: 04/25/2024  Last Med refill: 07/22/2024  Does patient have enough medication for 72 hours: No:     Next Visit Date:  Future Appointments   Date Time Provider Department Center   10/28/2024  1:00 PM Tiffanie Rodriguez MD [facility]       Health Maintenance   Topic Date Due    DTaP/Tdap/Td vaccine (1 - Tdap) Never done    Cervical cancer screen  Never done    Shingles vaccine (1 of 2) Never done    Flu vaccine (1) Never done    COVID-19 Vaccine (3 - 2023-24 season) 09/01/2024    Lipids  09/28/2024    Pneumococcal 0-64 years Vaccine (1 of 2 - PCV) 11/15/2024 (Originally 3/24/1966)    Respiratory Syncytial Virus (RSV) Pregnant or age 60 yrs+ (1 - 1-dose 60+ series) 04/25/2025 (Originally 3/24/2020)    Depression Monitoring  01/11/2025    Breast cancer screen  04/25/2026    Colorectal Cancer Screen  07/25/2029    Hepatitis C screen  Completed    HIV screen  Completed    Hepatitis A vaccine  Aged Out    Hepatitis B vaccine  Aged Out    Hib vaccine  Aged Out    Polio vaccine  Aged Out    Meningococcal (ACWY) vaccine  Aged Out    A1C test (Diabetic or Prediabetic)  Discontinued    Lung Cancer Screening &/or Counseling  Discontinued       Hemoglobin A1C (%)   Date Value   01/24/2023 5.6   10/07/2020 5.7   05/01/2019 5.7             ( goal A1C is < 7)   No components found for: \"LABMICR\"  No components found for: \"LDLCHOLESTEROL\", \"LDLCALC\"    (goal LDL is <100)   AST (U/L)   Date Value   09/28/2023 14     ALT (U/L)   Date Value   09/28/2023 11     BUN (mg/dL)   Date Value   09/28/2023 10     BP Readings from Last 3 Encounters:   04/25/24 122/80   01/11/24 120/82   09/28/23 120/82          (goal 120/80)    All Future Testing planned in [facility]              Patient Active Problem List:     Recurrent major depressive disorder, in partial remission (HCC)     Essential hypertension     Hypercholesterolemia     ELY (generalized anxiety disorder)     Jordan-Danlos syndrome     Temporomandibular

## 2024-10-12 DIAGNOSIS — G89.29 CHRONIC GENERALIZED PAIN: ICD-10-CM

## 2024-10-12 DIAGNOSIS — R52 CHRONIC GENERALIZED PAIN: ICD-10-CM

## 2024-10-14 RX ORDER — TRAMADOL HYDROCHLORIDE 50 MG/1
TABLET ORAL
Qty: 30 TABLET | Refills: 0 | OUTPATIENT
Start: 2024-10-14

## 2024-10-14 NOTE — TELEPHONE ENCOUNTER
Patient states she has an appt on 10/28 and will do the UDS and contract then. Informed patient a refill will not be sent until then. Patient verbalized understanding.

## 2024-10-22 ENCOUNTER — HOSPITAL ENCOUNTER (OUTPATIENT)
Age: 64
Setting detail: SPECIMEN
Discharge: HOME | End: 2024-10-22

## 2024-10-22 ENCOUNTER — OFFICE VISIT (OUTPATIENT)
Dept: FAMILY MEDICINE CLINIC | Age: 64
End: 2024-10-22
Payer: COMMERCIAL

## 2024-10-22 VITALS
WEIGHT: 150.6 LBS | BODY MASS INDEX: 27.55 KG/M2 | OXYGEN SATURATION: 95 % | HEART RATE: 103 BPM | TEMPERATURE: 98 F | DIASTOLIC BLOOD PRESSURE: 82 MMHG | SYSTOLIC BLOOD PRESSURE: 124 MMHG

## 2024-10-22 DIAGNOSIS — Z13.0 SCREENING, ANEMIA, DEFICIENCY, IRON: ICD-10-CM

## 2024-10-22 DIAGNOSIS — G89.29 CHRONIC GENERALIZED PAIN: ICD-10-CM

## 2024-10-22 DIAGNOSIS — Z72.0 TOBACCO ABUSE: ICD-10-CM

## 2024-10-22 DIAGNOSIS — E78.00 HYPERCHOLESTEROLEMIA: ICD-10-CM

## 2024-10-22 DIAGNOSIS — E55.9 VITAMIN D DEFICIENCY: ICD-10-CM

## 2024-10-22 DIAGNOSIS — R52 CHRONIC GENERALIZED PAIN: ICD-10-CM

## 2024-10-22 DIAGNOSIS — Z13.29 SCREENING FOR THYROID DISORDER: ICD-10-CM

## 2024-10-22 DIAGNOSIS — M53.3 SACROILIAC PAIN: ICD-10-CM

## 2024-10-22 DIAGNOSIS — F41.1 GAD (GENERALIZED ANXIETY DISORDER): ICD-10-CM

## 2024-10-22 DIAGNOSIS — Q79.60 EHLERS-DANLOS SYNDROME: Primary | ICD-10-CM

## 2024-10-22 DIAGNOSIS — I10 ESSENTIAL HYPERTENSION: ICD-10-CM

## 2024-10-22 LAB
25(OH)D3 SERPL-MCNC: 22.9 NG/ML (ref 30–100)
ALBUMIN SERPL-MCNC: 4.3 G/DL (ref 3.5–5.2)
ALBUMIN/GLOB SERPL: 2 {RATIO} (ref 1–2.5)
ALP SERPL-CCNC: 141 U/L (ref 35–104)
ALT SERPL-CCNC: 9 U/L (ref 10–35)
ANION GAP SERPL CALCULATED.3IONS-SCNC: 11 MMOL/L (ref 9–16)
AST SERPL-CCNC: 17 U/L (ref 10–35)
BASOPHILS # BLD: 0.06 K/UL (ref 0–0.2)
BASOPHILS NFR BLD: 1 % (ref 0–2)
BILIRUB SERPL-MCNC: 0.2 MG/DL (ref 0–1.2)
BUN SERPL-MCNC: 9 MG/DL (ref 8–23)
CALCIUM SERPL-MCNC: 9.3 MG/DL (ref 8.6–10.4)
CHLORIDE SERPL-SCNC: 100 MMOL/L (ref 98–107)
CHOLEST SERPL-MCNC: 182 MG/DL (ref 0–199)
CHOLESTEROL/HDL RATIO: 4
CO2 SERPL-SCNC: 31 MMOL/L (ref 20–31)
CREAT SERPL-MCNC: 0.8 MG/DL (ref 0.5–0.9)
EOSINOPHIL # BLD: 0.12 K/UL (ref 0–0.44)
EOSINOPHILS RELATIVE PERCENT: 2 % (ref 1–4)
ERYTHROCYTE [DISTWIDTH] IN BLOOD BY AUTOMATED COUNT: 13.5 % (ref 11.8–14.4)
GFR, ESTIMATED: 79 ML/MIN/1.73M2
GLUCOSE SERPL-MCNC: 112 MG/DL (ref 74–99)
HCT VFR BLD AUTO: 48.6 % (ref 36.3–47.1)
HDLC SERPL-MCNC: 50 MG/DL
HGB BLD-MCNC: 15.2 G/DL (ref 11.9–15.1)
IMM GRANULOCYTES # BLD AUTO: 0.03 K/UL (ref 0–0.3)
IMM GRANULOCYTES NFR BLD: 0 %
LDLC SERPL CALC-MCNC: 111 MG/DL (ref 0–100)
LYMPHOCYTES NFR BLD: 1.79 K/UL (ref 1.1–3.7)
LYMPHOCYTES RELATIVE PERCENT: 24 % (ref 24–43)
MCH RBC QN AUTO: 28.4 PG (ref 25.2–33.5)
MCHC RBC AUTO-ENTMCNC: 31.3 G/DL (ref 28.4–34.8)
MCV RBC AUTO: 90.7 FL (ref 82.6–102.9)
MONOCYTES NFR BLD: 0.72 K/UL (ref 0.1–1.2)
MONOCYTES NFR BLD: 10 % (ref 3–12)
NEUTROPHILS NFR BLD: 63 % (ref 36–65)
NEUTS SEG NFR BLD: 4.65 K/UL (ref 1.5–8.1)
NRBC BLD-RTO: 0 PER 100 WBC
PLATELET # BLD AUTO: 223 K/UL (ref 138–453)
PMV BLD AUTO: 11 FL (ref 8.1–13.5)
POTASSIUM SERPL-SCNC: 4.3 MMOL/L (ref 3.7–5.3)
PROT SERPL-MCNC: 6.5 G/DL (ref 6.6–8.7)
RBC # BLD AUTO: 5.36 M/UL (ref 3.95–5.11)
SODIUM SERPL-SCNC: 142 MMOL/L (ref 136–145)
TRIGL SERPL-MCNC: 103 MG/DL (ref 0–149)
TSH SERPL DL<=0.05 MIU/L-ACNC: 3.11 UIU/ML (ref 0.27–4.2)
VLDLC SERPL CALC-MCNC: 21 MG/DL
WBC OTHER # BLD: 7.4 K/UL (ref 3.5–11.3)

## 2024-10-22 PROCEDURE — 3079F DIAST BP 80-89 MM HG: CPT | Performed by: INTERNAL MEDICINE

## 2024-10-22 PROCEDURE — 3074F SYST BP LT 130 MM HG: CPT | Performed by: INTERNAL MEDICINE

## 2024-10-22 PROCEDURE — 99214 OFFICE O/P EST MOD 30 MIN: CPT | Performed by: INTERNAL MEDICINE

## 2024-10-22 RX ORDER — TRAMADOL HYDROCHLORIDE 50 MG/1
50 TABLET ORAL DAILY PRN
Qty: 30 TABLET | Refills: 0 | Status: SHIPPED | OUTPATIENT
Start: 2024-10-22 | End: 2024-11-21

## 2024-10-22 RX ORDER — LATANOPROST 50 UG/ML
SOLUTION/ DROPS OPHTHALMIC
COMMUNITY
Start: 2024-08-01

## 2024-10-22 SDOH — ECONOMIC STABILITY: FOOD INSECURITY: WITHIN THE PAST 12 MONTHS, YOU WORRIED THAT YOUR FOOD WOULD RUN OUT BEFORE YOU GOT MONEY TO BUY MORE.: NEVER TRUE

## 2024-10-22 SDOH — ECONOMIC STABILITY: FOOD INSECURITY: WITHIN THE PAST 12 MONTHS, THE FOOD YOU BOUGHT JUST DIDN'T LAST AND YOU DIDN'T HAVE MONEY TO GET MORE.: NEVER TRUE

## 2024-10-22 SDOH — ECONOMIC STABILITY: INCOME INSECURITY: HOW HARD IS IT FOR YOU TO PAY FOR THE VERY BASICS LIKE FOOD, HOUSING, MEDICAL CARE, AND HEATING?: NOT HARD AT ALL

## 2024-10-22 ASSESSMENT — ENCOUNTER SYMPTOMS
CONSTIPATION: 0
CHEST TIGHTNESS: 0
WHEEZING: 0
VOMITING: 0
NAUSEA: 0
DIARRHEA: 0
BLOOD IN STOOL: 0
COUGH: 0
CHOKING: 0
BACK PAIN: 1
SHORTNESS OF BREATH: 0
ANAL BLEEDING: 0
ABDOMINAL PAIN: 0

## 2024-10-22 ASSESSMENT — VISUAL ACUITY: OU: 1

## 2024-10-22 NOTE — PROGRESS NOTES
[facility]  [facility]  [address] [place] [id number]  Dept: [phone]  Dept Fax: [phone]      Natalie Solomon is a 64 y.o. female who presents today for hermedical conditions/complaints as noted below.  Natalie Solomon is c/o of Hypertension (F/u), Hyperlipidemia (F/u), and Other (Discuss letter for her apartments for a handicap parking spot )        Assessment/Plan:     1. Jordan-Danlos syndrome  Comments:  stable, following with specialist for injections  2. Chronic generalized pain  Comments:  UDS [place], med agreement signed today  Orders:  -     traMADol (ULTRAM) 50 MG tablet; Take 1 tablet by mouth daily as needed for Pain for up to 30 days. Max Daily Amount: 50 mg, Disp-30 tablet, R-0Normal  3. Essential hypertension  -     Comprehensive Metabolic Panel; Future  -     Lipid, Fasting; Future  4. ELY (generalized anxiety disorder)  Comments:  well controlled on current regimen, continue current medication  5. Hypercholesterolemia  -     Lipid, Fasting; Future  6. Sacroiliac pain  7. Tobacco abuse  8. Screening, anemia, deficiency, iron  -     CBC with Auto Differential; Future  9. Screening for thyroid disorder  -     TSH With Reflex Ft4; Future  10. Vitamin D deficiency  -     Vitamin D 25 Hydroxy; Future    Tobacco Use Counseling: Patient was counseled on tobacco cessation. Based upon patient's motivation to change her behavior, the following plan was agreed upon: Patient is not ready to work toward tobacco cessation at this time. Educational materials regarding tobacco cessation were provided. Provider spent 4 minutes counseling patient.         No follow-ups on file.      HPI     Hypertension-tolerating current regimen without chest pain, palpitations, dizziness, peripheral edema, dyspnea on exertion, orthopnea, paroxysmal nocturnal dyspnea.  Hyperlipidemia-tolerating current regimen without myalgias, dyspepsia, jaundice.   Requesting refill on tramadol, last dose

## 2024-10-23 DIAGNOSIS — E78.00 HYPERCHOLESTEROLEMIA: ICD-10-CM

## 2024-10-24 RX ORDER — SIMVASTATIN 40 MG
40 TABLET ORAL DAILY
Qty: 90 TABLET | Refills: 1 | Status: SHIPPED | OUTPATIENT
Start: 2024-10-24

## 2024-10-24 NOTE — TELEPHONE ENCOUNTER
Last visit: 10/22/24  Last Med refill: 7/22/24  Does patient have enough medication for 72 hours: No:     Next Visit Date:  Future Appointments   Date Time Provider Department Center   2/25/2025  1:45 PM Tiffanie [facility] MD [facility]       Health Maintenance   Topic Date Due    DTaP/Tdap/Td vaccine (1 - Tdap) Never done    Cervical cancer screen  Never done    Shingles vaccine (1 of 2) Never done    COVID-19 Vaccine (3 - 2023-24 season) 09/01/2024    Pneumococcal 0-64 years Vaccine (1 of 2 - PCV) 11/15/2024 (Originally 3/24/1966)    Respiratory Syncytial Virus (RSV) Pregnant or age 60 yrs+ (1 - 1-dose 60+ series) 04/25/2025 (Originally 3/24/2020)    Flu vaccine (1) 10/22/2025 (Originally 8/1/2024)    Depression Monitoring  01/11/2025    Lipids  10/22/2025    Breast cancer screen  10/22/2025    Diabetes screen  01/24/2026    Colorectal Cancer Screen  07/25/2029    Hepatitis C screen  Completed    HIV screen  Completed    Hepatitis A vaccine  Aged Out    Hepatitis B vaccine  Aged Out    Hib vaccine  Aged Out    Polio vaccine  Aged Out    Meningococcal (ACWY) vaccine  Aged Out    A1C test (Diabetic or Prediabetic)  Discontinued    Lung Cancer Screening &/or Counseling  Discontinued       Hemoglobin A1C (%)   Date Value   01/24/2023 5.6   10/07/2020 5.7   05/01/2019 5.7             ( goal A1C is < 7)   No components found for: \"LABMICR\"  No components found for: \"LDLCHOLESTEROL\", \"LDLCALC\"    (goal LDL is <100)   AST (U/L)   Date Value   10/22/2024 17     ALT (U/L)   Date Value   10/22/2024 9 (L)     BUN (mg/dL)   Date Value   10/22/2024 9     BP Readings from Last 3 Encounters:   10/22/24 124/82   04/25/24 122/80   01/11/24 120/82          (goal 120/80)    All Future Testing planned in [facility]  Lab Frequency Next Occurrence   Urine Drug Screen Once 10/11/2024               Patient Active Problem List:     Recurrent major depressive disorder, in partial remission (HCC)     Essential hypertension

## 2024-11-22 DIAGNOSIS — G89.29 CHRONIC GENERALIZED PAIN: ICD-10-CM

## 2024-11-22 DIAGNOSIS — R52 CHRONIC GENERALIZED PAIN: ICD-10-CM

## 2024-11-25 RX ORDER — TRAMADOL HYDROCHLORIDE 50 MG/1
50 TABLET ORAL NIGHTLY PRN
Qty: 30 TABLET | Refills: 1 | Status: SHIPPED | OUTPATIENT
Start: 2024-11-25 | End: 2025-01-24

## 2024-11-25 NOTE — TELEPHONE ENCOUNTER
Last visit: 10/22/2024  Last Med refill: 10/22/2024  Does patient have enough medication for 72 hours: No:     Next Visit Date:  Future Appointments   Date Time Provider Department Center   2/25/2025  1:45 PM Tiffanie Rodriguez MD [facility]       Health Maintenance   Topic Date Due    Pneumococcal 0-64 years Vaccine (1 of 2 - PCV) Never done    DTaP/Tdap/Td vaccine (1 - Tdap) Never done    Cervical cancer screen  Never done    Shingles vaccine (1 of 2) Never done    Lung Cancer Screening &/or Counseling  Never done    COVID-19 Vaccine (3 - 2023-24 season) 09/01/2024    Flu vaccine (1) 10/22/2025 (Originally 8/1/2024)    Depression Monitoring  01/11/2025    Lipids  10/22/2025    Breast cancer screen  10/22/2025    Diabetes screen  01/24/2026    Colorectal Cancer Screen  07/25/2029    Respiratory Syncytial Virus (RSV) Pregnant or age 60 yrs+ (1 - 1-dose 75+ series) 03/24/2035    Hepatitis C screen  Completed    HIV screen  Completed    Hepatitis A vaccine  Aged Out    Hepatitis B vaccine  Aged Out    Hib vaccine  Aged Out    Polio vaccine  Aged Out    Meningococcal (ACWY) vaccine  Aged Out    A1C test (Diabetic or Prediabetic)  Discontinued       Hemoglobin A1C (%)   Date Value   01/24/2023 5.6   10/07/2020 5.7   05/01/2019 5.7             ( goal A1C is < 7)   No components found for: \"LABMICR\"  No components found for: \"LDLCHOLESTEROL\", \"LDLCALC\"    (goal LDL is <100)   AST (U/L)   Date Value   10/22/2024 17     ALT (U/L)   Date Value   10/22/2024 9 (L)     BUN (mg/dL)   Date Value   10/22/2024 9     BP Readings from Last 3 Encounters:   10/22/24 124/82   04/25/24 122/80   01/11/24 120/82          (goal 120/80)    All Future Testing planned in [facility]  Lab Frequency Next Occurrence   Urine Drug Screen Once 10/11/2024               Patient Active Problem List:     Recurrent major depressive disorder, in partial remission (HCC)     Essential hypertension     Hypercholesterolemia     ELY (generalized

## 2025-01-09 DIAGNOSIS — I10 ESSENTIAL HYPERTENSION: ICD-10-CM

## 2025-01-09 DIAGNOSIS — G47.9 SLEEPING DIFFICULTY: ICD-10-CM

## 2025-01-09 RX ORDER — ATENOLOL 25 MG/1
25 TABLET ORAL DAILY
Qty: 90 TABLET | Refills: 1 | Status: SHIPPED | OUTPATIENT
Start: 2025-01-09

## 2025-01-09 RX ORDER — QUETIAPINE FUMARATE 50 MG/1
50 TABLET, FILM COATED ORAL NIGHTLY
Qty: 90 TABLET | Refills: 1 | Status: SHIPPED | OUTPATIENT
Start: 2025-01-09

## 2025-01-09 NOTE — TELEPHONE ENCOUNTER
Last visit: 10/22/24  Last Med refill: 10/11/24  Does patient have enough medication for 72 hours: Yes    Next Visit Date:  Future Appointments   Date Time Provider Department Center   2/25/2025  1:45 PM Tiffanie Rodriguez MD [facility]       Health Maintenance   Topic Date Due    Pneumococcal 0-64 years Vaccine (1 of 2 - PCV) Never done    DTaP/Tdap/Td vaccine (1 - Tdap) Never done    Cervical cancer screen  Never done    Shingles vaccine (1 of 2) Never done    Lung Cancer Screening &/or Counseling  Never done    COVID-19 Vaccine (3 - 2023-24 season) 09/01/2024    Depression Monitoring  01/11/2025    Flu vaccine (1) 10/22/2025 (Originally 8/1/2024)    Lipids  10/22/2025    Breast cancer screen  10/22/2025    Diabetes screen  01/24/2026    Colorectal Cancer Screen  07/25/2029    Respiratory Syncytial Virus (RSV) Pregnant or age 60 yrs+ (1 - 1-dose 75+ series) 03/24/2035    Hepatitis C screen  Completed    HIV screen  Completed    Hepatitis A vaccine  Aged Out    Hepatitis B vaccine  Aged Out    Hib vaccine  Aged Out    Polio vaccine  Aged Out    Meningococcal (ACWY) vaccine  Aged Out    A1C test (Diabetic or Prediabetic)  Discontinued       Hemoglobin A1C (%)   Date Value   01/24/2023 5.6   10/07/2020 5.7   05/01/2019 5.7             ( goal A1C is < 7)   No components found for: \"LABMICR\"  No components found for: \"LDLCHOLESTEROL\", \"LDLCALC\"    (goal LDL is <100)   AST (U/L)   Date Value   10/22/2024 17     ALT (U/L)   Date Value   10/22/2024 9 (L)     BUN (mg/dL)   Date Value   10/22/2024 9     BP Readings from Last 3 Encounters:   10/22/24 124/82   04/25/24 122/80   01/11/24 120/82          (goal 120/80)    All Future Testing planned in [facility]  Lab Frequency Next Occurrence   Urine Drug Screen Once 10/11/2024               Patient Active Problem List:     Recurrent major depressive disorder, in partial remission (HCC)     Essential hypertension     Hypercholesterolemia     ELY (generalized anxiety

## 2025-01-23 DIAGNOSIS — G89.29 CHRONIC GENERALIZED PAIN: ICD-10-CM

## 2025-01-23 DIAGNOSIS — R52 CHRONIC GENERALIZED PAIN: ICD-10-CM

## 2025-01-23 NOTE — TELEPHONE ENCOUNTER
Last visit: 10/22/24  Last Med refill: 11/25/24  Does patient have enough medication for 72 hours: No:     Next Visit Date:  Future Appointments   Date Time Provider Department Center   2/25/2025  1:45 PM Tiffanie Rodriguez MD [facility]       Health Maintenance   Topic Date Due    Pneumococcal 0-64 years Vaccine (1 of 2 - PCV) Never done    DTaP/Tdap/Td vaccine (1 - Tdap) Never done    Cervical cancer screen  Never done    Shingles vaccine (1 of 2) Never done    Lung Cancer Screening &/or Counseling  Never done    COVID-19 Vaccine (3 - 2023-24 season) 09/01/2024    Depression Monitoring  01/11/2025    Flu vaccine (1) 10/22/2025 (Originally 8/1/2024)    Lipids  10/22/2025    Breast cancer screen  10/22/2025    Colorectal Cancer Screen  07/25/2029    Respiratory Syncytial Virus (RSV) Pregnant or age 60 yrs+ (1 - 1-dose 75+ series) 03/24/2035    Hepatitis C screen  Completed    HIV screen  Completed    Hepatitis A vaccine  Aged Out    Hepatitis B vaccine  Aged Out    Hib vaccine  Aged Out    Polio vaccine  Aged Out    Meningococcal (ACWY) vaccine  Aged Out    A1C test (Diabetic or Prediabetic)  Discontinued    Diabetes screen  Discontinued       Hemoglobin A1C (%)   Date Value   01/24/2023 5.6   10/07/2020 5.7   05/01/2019 5.7             ( goal A1C is < 7)   No components found for: \"LABMICR\"  No components found for: \"LDLCHOLESTEROL\", \"LDLCALC\"    (goal LDL is <100)   AST (U/L)   Date Value   10/22/2024 17     ALT (U/L)   Date Value   10/22/2024 9 (L)     BUN (mg/dL)   Date Value   10/22/2024 9     BP Readings from Last 3 Encounters:   10/22/24 124/82   04/25/24 122/80   01/11/24 120/82          (goal 120/80)    All Future Testing planned in [facility]  Lab Frequency Next Occurrence   Urine Drug Screen Once 10/11/2024               Patient Active Problem List:     Recurrent major depressive disorder, in partial remission (HCC)     Essential hypertension     Hypercholesterolemia     ELY (generalized

## 2025-01-24 RX ORDER — TRAMADOL HYDROCHLORIDE 50 MG/1
50 TABLET ORAL NIGHTLY PRN
Qty: 30 TABLET | Refills: 1 | Status: SHIPPED | OUTPATIENT
Start: 2025-01-24 | End: 2025-03-25

## 2025-01-27 ENCOUNTER — PATIENT MESSAGE (OUTPATIENT)
Dept: FAMILY MEDICINE CLINIC | Age: 65
End: 2025-01-27

## 2025-01-28 RX ORDER — AMOXICILLIN 875 MG/1
875 TABLET, COATED ORAL 2 TIMES DAILY
Qty: 14 TABLET | Refills: 0 | Status: SHIPPED | OUTPATIENT
Start: 2025-01-28 | End: 2025-02-04

## 2025-02-16 DIAGNOSIS — F41.1 GAD (GENERALIZED ANXIETY DISORDER): ICD-10-CM

## 2025-02-16 NOTE — TELEPHONE ENCOUNTER
Last visit: 10/22/2024  Last Med refill: 10/27/2024  Does patient have enough medication for 72 hours: No:     Next Visit Date:  Future Appointments   Date Time Provider Department Center   3/6/2025  1:45 PM Tiffanie Rodriguez MD [facility]       Health Maintenance   Topic Date Due    DTaP/Tdap/Td vaccine (1 - Tdap) Never done    Pneumococcal 50+ years Vaccine (1 of 2 - PCV) Never done    Cervical cancer screen  Never done    Shingles vaccine (1 of 2) Never done    Lung Cancer Screening &/or Counseling  Never done    COVID-19 Vaccine (3 - 2024-25 season) 09/01/2024    Depression Monitoring  01/11/2025    Flu vaccine (1) 10/22/2025 (Originally 8/1/2024)    Lipids  10/22/2025    Breast cancer screen  10/22/2025    Colorectal Cancer Screen  07/25/2029    Respiratory Syncytial Virus (RSV) Pregnant or age 60 yrs+ (1 - 1-dose 75+ series) 03/24/2035    Hepatitis C screen  Completed    HIV screen  Completed    Hepatitis A vaccine  Aged Out    Hepatitis B vaccine  Aged Out    Hib vaccine  Aged Out    Polio vaccine  Aged Out    Meningococcal (ACWY) vaccine  Aged Out    A1C test (Diabetic or Prediabetic)  Discontinued    Diabetes screen  Discontinued       Hemoglobin A1C (%)   Date Value   01/24/2023 5.6   10/07/2020 5.7   05/01/2019 5.7             ( goal A1C is < 7)   No components found for: \"LABMICR\"  No components found for: \"LDLCHOLESTEROL\", \"LDLCALC\"    (goal LDL is <100)   AST (U/L)   Date Value   10/22/2024 17     ALT (U/L)   Date Value   10/22/2024 9 (L)     BUN (mg/dL)   Date Value   10/22/2024 9     BP Readings from Last 3 Encounters:   10/22/24 124/82   04/25/24 122/80   01/11/24 120/82          (goal 120/80)    All Future Testing planned in [facility]  Lab Frequency Next Occurrence   Urine Drug Screen Once 10/11/2024               Patient Active Problem List:     Recurrent major depressive disorder, in partial remission (HCC)     Essential hypertension     Hypercholesterolemia     ELY (generalized

## 2025-02-17 RX ORDER — HYDROXYZINE PAMOATE 50 MG/1
CAPSULE ORAL
Qty: 180 CAPSULE | Refills: 0 | Status: SHIPPED | OUTPATIENT
Start: 2025-02-17

## 2025-03-06 ENCOUNTER — OFFICE VISIT (OUTPATIENT)
Dept: FAMILY MEDICINE CLINIC | Age: 65
End: 2025-03-06

## 2025-03-06 VITALS
TEMPERATURE: 97.3 F | HEIGHT: 62 IN | SYSTOLIC BLOOD PRESSURE: 104 MMHG | OXYGEN SATURATION: 92 % | DIASTOLIC BLOOD PRESSURE: 68 MMHG | HEART RATE: 72 BPM | BODY MASS INDEX: 28.34 KG/M2 | WEIGHT: 154 LBS

## 2025-03-06 DIAGNOSIS — Z87.891 PERSONAL HISTORY OF TOBACCO USE, PRESENTING HAZARDS TO HEALTH: ICD-10-CM

## 2025-03-06 DIAGNOSIS — E78.00 HYPERCHOLESTEROLEMIA: ICD-10-CM

## 2025-03-06 DIAGNOSIS — I10 ESSENTIAL HYPERTENSION: ICD-10-CM

## 2025-03-06 DIAGNOSIS — G89.29 CHRONIC GENERALIZED PAIN: ICD-10-CM

## 2025-03-06 DIAGNOSIS — H90.3 SENSORINEURAL HEARING LOSS (SNHL) OF BOTH EARS: ICD-10-CM

## 2025-03-06 DIAGNOSIS — Z00.00 WELCOME TO MEDICARE PREVENTIVE VISIT: Primary | ICD-10-CM

## 2025-03-06 DIAGNOSIS — F41.1 GAD (GENERALIZED ANXIETY DISORDER): ICD-10-CM

## 2025-03-06 DIAGNOSIS — Z13.6 SCREENING FOR CARDIOVASCULAR CONDITION: ICD-10-CM

## 2025-03-06 DIAGNOSIS — R52 CHRONIC GENERALIZED PAIN: ICD-10-CM

## 2025-03-06 DIAGNOSIS — Z71.89 ACP (ADVANCE CARE PLANNING): ICD-10-CM

## 2025-03-06 DIAGNOSIS — Z72.0 TOBACCO ABUSE: ICD-10-CM

## 2025-03-06 RX ORDER — HYDROCHLOROTHIAZIDE 12.5 MG/1
12.5 CAPSULE ORAL EVERY MORNING
Qty: 90 CAPSULE | Refills: 1 | Status: SHIPPED | OUTPATIENT
Start: 2025-03-06

## 2025-03-06 RX ORDER — ERGOCALCIFEROL 1.25 MG/1
50000 CAPSULE, LIQUID FILLED ORAL WEEKLY
Qty: 12 CAPSULE | Refills: 1 | Status: SHIPPED | OUTPATIENT
Start: 2025-03-06

## 2025-03-06 RX ORDER — SIMVASTATIN 40 MG
40 TABLET ORAL DAILY
Qty: 90 TABLET | Refills: 1 | Status: SHIPPED | OUTPATIENT
Start: 2025-03-06

## 2025-03-06 SDOH — ECONOMIC STABILITY: FOOD INSECURITY: WITHIN THE PAST 12 MONTHS, YOU WORRIED THAT YOUR FOOD WOULD RUN OUT BEFORE YOU GOT MONEY TO BUY MORE.: NEVER TRUE

## 2025-03-06 SDOH — ECONOMIC STABILITY: FOOD INSECURITY: WITHIN THE PAST 12 MONTHS, THE FOOD YOU BOUGHT JUST DIDN'T LAST AND YOU DIDN'T HAVE MONEY TO GET MORE.: NEVER TRUE

## 2025-03-06 ASSESSMENT — PATIENT HEALTH QUESTIONNAIRE - PHQ9
8. MOVING OR SPEAKING SO SLOWLY THAT OTHER PEOPLE COULD HAVE NOTICED. OR THE OPPOSITE, BEING SO FIGETY OR RESTLESS THAT YOU HAVE BEEN MOVING AROUND A LOT MORE THAN USUAL: NOT AT ALL
SUM OF ALL RESPONSES TO PHQ QUESTIONS 1-9: 0
5. POOR APPETITE OR OVEREATING: NOT AT ALL
10. IF YOU CHECKED OFF ANY PROBLEMS, HOW DIFFICULT HAVE THESE PROBLEMS MADE IT FOR YOU TO DO YOUR WORK, TAKE CARE OF THINGS AT HOME, OR GET ALONG WITH OTHER PEOPLE: NOT DIFFICULT AT ALL
1. LITTLE INTEREST OR PLEASURE IN DOING THINGS: NOT AT ALL
3. TROUBLE FALLING OR STAYING ASLEEP: NOT AT ALL
7. TROUBLE CONCENTRATING ON THINGS, SUCH AS READING THE NEWSPAPER OR WATCHING TELEVISION: NOT AT ALL
4. FEELING TIRED OR HAVING LITTLE ENERGY: NOT AT ALL
SUM OF ALL RESPONSES TO PHQ QUESTIONS 1-9: 0
6. FEELING BAD ABOUT YOURSELF - OR THAT YOU ARE A FAILURE OR HAVE LET YOURSELF OR YOUR FAMILY DOWN: NOT AT ALL
9. THOUGHTS THAT YOU WOULD BE BETTER OFF DEAD, OR OF HURTING YOURSELF: NOT AT ALL
SUM OF ALL RESPONSES TO PHQ QUESTIONS 1-9: 0
SUM OF ALL RESPONSES TO PHQ QUESTIONS 1-9: 0
2. FEELING DOWN, DEPRESSED OR HOPELESS: NOT AT ALL

## 2025-03-06 ASSESSMENT — LIFESTYLE VARIABLES
HOW OFTEN DO YOU HAVE A DRINK CONTAINING ALCOHOL: NEVER
HOW MANY STANDARD DRINKS CONTAINING ALCOHOL DO YOU HAVE ON A TYPICAL DAY: PATIENT DOES NOT DRINK

## 2025-03-06 NOTE — PROGRESS NOTES
Medicare Annual Wellness Visit    Natalie Solomon is here for Anxiety (F/u), Hypertension (F/u), Medicare AWV (Having headache ), and Arm Pain (Discuss ortho )    Assessment & Plan   Welcome to Medicare preventive visit  Hypercholesterolemia  -     simvastatin (ZOCOR) 40 MG tablet; Take 1 tablet by mouth daily, Disp-90 tablet, R-1Normal  Essential hypertension  -     hydroCHLOROthiazide 12.5 MG capsule; Take 1 capsule by mouth every morning, Disp-90 capsule, R-1Normal  Sensorineural hearing loss (SNHL) of both ears  ELY (generalized anxiety disorder)  Tobacco abuse  Chronic generalized pain  ACP (advance care planning)  -     [place] Advanced Care Planning (16-30 minutes) [35461]  Personal history of tobacco use, presenting hazards to health  -     [place] Smoking and Tobacco Use Cessation (Intermediate): 3-10 MINUTES [37284]  Screening for cardiovascular condition  -     [place] Intensive Behavior Counseling for Cardiovascular Diseases, 8-15 minutes []       No follow-ups on file.     Subjective   The following acute and/or chronic problems were also addressed today:  L elbow pain - medial epicondyle, feels swollen and tender. Started within the past month, hurts to sit with arm on armrest of chair. Has been taking naproxen without relief.   Pain in back of her head on left side - sharp pain, occurring once or twice a week, lasting a second then goes away, feels like \"lightning right through my brain\". No blurry vsion, double vision, L eye watering, nausea a/w episodes.   Worst winter for her joint pain - the tramadol and flexeril seem to be wearing off sooner   Gerd - comes and goes.     Patient's complete Health Risk Assessment and screening values have been reviewed and are found in Flowsheets. The following problems were reviewed today and where indicated follow up appointments were made and/or referrals ordered.    Positive Risk Factor Screenings with Interventions:          Controlled Medication Review:    Today's Pain

## 2025-03-12 ENCOUNTER — RESULTS FOLLOW-UP (OUTPATIENT)
Dept: FAMILY MEDICINE CLINIC | Age: 65
End: 2025-03-12

## 2025-03-26 DIAGNOSIS — F41.1 GAD (GENERALIZED ANXIETY DISORDER): ICD-10-CM

## 2025-03-26 DIAGNOSIS — F33.41 RECURRENT MAJOR DEPRESSIVE DISORDER, IN PARTIAL REMISSION: ICD-10-CM

## 2025-03-26 RX ORDER — DULOXETIN HYDROCHLORIDE 60 MG/1
60 CAPSULE, DELAYED RELEASE ORAL 2 TIMES DAILY
Qty: 180 CAPSULE | Refills: 1 | Status: SHIPPED | OUTPATIENT
Start: 2025-03-26

## 2025-04-08 DIAGNOSIS — G89.29 CHRONIC GENERALIZED PAIN: ICD-10-CM

## 2025-04-08 DIAGNOSIS — R52 CHRONIC GENERALIZED PAIN: ICD-10-CM

## 2025-04-09 RX ORDER — TRAMADOL HYDROCHLORIDE 50 MG/1
50 TABLET ORAL NIGHTLY PRN
Qty: 30 TABLET | Refills: 0 | Status: SHIPPED | OUTPATIENT
Start: 2025-04-09 | End: 2025-05-09

## 2025-04-09 NOTE — TELEPHONE ENCOUNTER
Last visit: 3/06/2025  Last Med refill: 03/06/2025  Does patient have enough medication for 72 hours: No:     Next Visit Date:  Future Appointments   Date Time Provider Department Center   7/7/2025  1:30 PM Tiffanie Rodriguez MD [facility] E[facility] DEP       Health Maintenance   Topic Date Due    DTaP/Tdap/Td vaccine (1 - Tdap) Never done    Cervical cancer screen  Never done    Shingles vaccine (1 of 2) Never done    DEXA (modify frequency per FRAX score)  Never done    COVID-19 Vaccine (3 - 2024-25 season) 09/01/2024    Flu vaccine (Season Ended) 10/22/2025 (Originally 8/1/2025)    Pneumococcal 50+ years Vaccine (1 of 2 - PCV) 03/06/2026 (Originally 3/24/1979)    Lung Cancer Screening &/or Counseling  03/06/2026 (Originally 3/24/2010)    Lipids  10/22/2025    Breast cancer screen  10/22/2025    Diabetes screen  01/24/2026    Depression Monitoring  03/06/2026    Colorectal Cancer Screen  07/25/2029    Respiratory Syncytial Virus (RSV) Pregnant or age 60 yrs+ (1 - 1-dose 75+ series) 03/24/2035    Annual Wellness Visit (Medicare Advantage)  Completed    Hepatitis C screen  Completed    HIV screen  Completed    Hepatitis A vaccine  Aged Out    Hepatitis B vaccine  Aged Out    Hib vaccine  Aged Out    Polio vaccine  Aged Out    Meningococcal (ACWY) vaccine  Aged Out    Meningococcal B vaccine  Aged Out    A1C test (Diabetic or Prediabetic)  Discontinued       Hemoglobin A1C (%)   Date Value   01/24/2023 5.6   10/07/2020 5.7   05/01/2019 5.7             ( goal A1C is < 7)   No components found for: \"LABMICR\"  No components found for: \"LDLCHOLESTEROL\", \"LDLCALC\"    (goal LDL is <100)   AST (U/L)   Date Value   10/22/2024 17     ALT (U/L)   Date Value   10/22/2024 9 (L)     BUN (mg/dL)   Date Value   10/22/2024 9     BP Readings from Last 3 Encounters:   03/06/25 104/68   10/22/24 124/82   04/25/24 122/80          (goal 120/80)    All Future Testing planned in [facility]  Lab Frequency Next Occurrence   Urine Drug Screen

## 2025-05-14 ENCOUNTER — OFFICE VISIT (OUTPATIENT)
Dept: FAMILY MEDICINE CLINIC | Age: 65
End: 2025-05-14
Payer: COMMERCIAL

## 2025-05-14 VITALS
WEIGHT: 150.8 LBS | DIASTOLIC BLOOD PRESSURE: 86 MMHG | HEART RATE: 104 BPM | OXYGEN SATURATION: 90 % | SYSTOLIC BLOOD PRESSURE: 132 MMHG | BODY MASS INDEX: 27.58 KG/M2

## 2025-05-14 DIAGNOSIS — G47.9 SLEEPING DIFFICULTY: ICD-10-CM

## 2025-05-14 DIAGNOSIS — Z72.0 TOBACCO ABUSE: ICD-10-CM

## 2025-05-14 DIAGNOSIS — R52 CHRONIC GENERALIZED PAIN: ICD-10-CM

## 2025-05-14 DIAGNOSIS — J01.90 ACUTE BACTERIAL SINUSITIS: ICD-10-CM

## 2025-05-14 DIAGNOSIS — I65.22 CAROTID ARTERY PLAQUE, LEFT: Primary | ICD-10-CM

## 2025-05-14 DIAGNOSIS — G89.29 CHRONIC GENERALIZED PAIN: ICD-10-CM

## 2025-05-14 DIAGNOSIS — B96.89 ACUTE BACTERIAL SINUSITIS: ICD-10-CM

## 2025-05-14 PROCEDURE — 3079F DIAST BP 80-89 MM HG: CPT | Performed by: INTERNAL MEDICINE

## 2025-05-14 PROCEDURE — 1123F ACP DISCUSS/DSCN MKR DOCD: CPT | Performed by: INTERNAL MEDICINE

## 2025-05-14 PROCEDURE — 3075F SYST BP GE 130 - 139MM HG: CPT | Performed by: INTERNAL MEDICINE

## 2025-05-14 PROCEDURE — 99214 OFFICE O/P EST MOD 30 MIN: CPT | Performed by: INTERNAL MEDICINE

## 2025-05-14 RX ORDER — TRAMADOL HYDROCHLORIDE 50 MG/1
50 TABLET ORAL NIGHTLY PRN
COMMUNITY
End: 2025-05-14 | Stop reason: SDUPTHER

## 2025-05-14 RX ORDER — QUETIAPINE FUMARATE 50 MG/1
50 TABLET, FILM COATED ORAL NIGHTLY
Qty: 90 TABLET | Refills: 0 | OUTPATIENT
Start: 2025-05-14

## 2025-05-14 RX ORDER — TRAMADOL HYDROCHLORIDE 50 MG/1
50 TABLET ORAL NIGHTLY PRN
Qty: 30 TABLET | Refills: 1 | Status: SHIPPED | OUTPATIENT
Start: 2025-05-14 | End: 2025-07-13

## 2025-05-14 RX ORDER — QUETIAPINE FUMARATE 50 MG/1
50 TABLET, FILM COATED ORAL NIGHTLY
Qty: 90 TABLET | Refills: 1 | Status: SHIPPED | OUTPATIENT
Start: 2025-05-14

## 2025-05-14 RX ORDER — IPRATROPIUM BROMIDE AND ALBUTEROL SULFATE 2.5; .5 MG/3ML; MG/3ML
SOLUTION RESPIRATORY (INHALATION)
COMMUNITY
Start: 2024-06-03

## 2025-05-14 RX ORDER — DOXYCYCLINE HYCLATE 100 MG
100 TABLET ORAL 2 TIMES DAILY
Qty: 14 TABLET | Refills: 0 | Status: SHIPPED | OUTPATIENT
Start: 2025-05-14 | End: 2025-05-21

## 2025-05-14 NOTE — PROGRESS NOTES
[facility]  [facility]  [address] [place] [id number]  Dept: [phone]  Dept Fax: [phone]      Natalie Solomon is a 65 y.o. female who presents today for hermedical conditions/complaints as noted below.  Natalie Solomon is c/o of Results (Discuss spine x-ray from pain management, results in media, getting pains ), Tinnitus, and Congestion (Has a lot of phlegm stuck in her throat, can not cough it up )        Assessment/Plan:     1. Carotid artery plaque, left  -     Vascular duplex carotid bilateral; Future  2. Sleeping difficulty  -     QUEtiapine (SEROQUEL) 50 MG tablet; Take 1 tablet by mouth at bedtime, Disp-90 tablet, R-1Normal  3. Acute bacterial sinusitis  -     doxycycline hyclate (VIBRA-TABS) 100 MG tablet; Take 1 tablet by mouth 2 times daily for 7 days, Disp-14 tablet, R-0Normal  4. Chronic generalized pain  -     traMADol (ULTRAM) 50 MG tablet; Take 1 tablet by mouth nightly as needed for Pain for up to 60 days. Max Daily Amount: 50 mg, Disp-30 tablet, R-1Normal  5. Tobacco abuse          No follow-ups on file.      HPI     Had neck xray done per pain management, concern for carotid plaque noted   She reports short term memory difficulties taht are getting more frequent - has  a harder time remembering where she puits things.       Chest Congestion  This is a new problem. The current episode started 1 to 4 weeks ago. The problem occurs constantly. The problem has been unchanged. Associated symptoms include congestion and fatigue. Pertinent negatives include no abdominal pain, anorexia, arthralgias, change in bowel habit, chest pain, chills, coughing, diaphoresis, fever, headaches, joint swelling, myalgias, nausea, neck pain, numbness, rash, sore throat, swollen glands, urinary symptoms, vertigo, visual change, vomiting or weakness. She has tried rest for the symptoms. The treatment provided mild relief.       BP Readings from Last 3 Encounters:   05/14/25

## 2025-05-14 NOTE — TELEPHONE ENCOUNTER
Last visit: 3/06/2025  Last Med refill: 10/11/2024  Does patient have enough medication for 72 hours: No:     Next Visit Date:  Future Appointments   Date Time Provider Department Center   5/14/2025  1:30 PM Tiffanie Rodriguez MD [facility] DEP   7/7/2025  1:30 PM Tiffanie Rodriguez MD [facility] DEP       Health Maintenance   Topic Date Due    DTaP/Tdap/Td vaccine (1 - Tdap) Never done    Cervical cancer screen  Never done    Shingles vaccine (1 of 2) Never done    DEXA (modify frequency per FRAX score)  Never done    COVID-19 Vaccine (3 - 2024-25 season) 09/01/2024    Flu vaccine (Season Ended) 10/22/2025 (Originally 8/1/2025)    Pneumococcal 50+ years Vaccine (1 of 2 - PCV) 03/06/2026 (Originally [birth year]/24/1979)    Lung Cancer Screening &/or Counseling  03/06/2026 (Originally 3/24/2010)    Lipids  10/22/2025    Breast cancer screen  10/22/2025    Diabetes screen  01/24/2026    Depression Monitoring  03/06/2026    Colorectal Cancer Screen  07/25/2029    Respiratory Syncytial Virus (RSV) Pregnant or age 60 yrs+ (1 - 1-dose 75+ series) 03/24/2035    Annual Wellness Visit (Medicare Advantage)  Completed    Hepatitis C screen  Completed    HIV screen  Completed    Hepatitis A vaccine  Aged Out    Hepatitis B vaccine  Aged Out    Hib vaccine  Aged Out    Polio vaccine  Aged Out    Meningococcal (ACWY) vaccine  Aged Out    Meningococcal B vaccine  Aged Out    A1C test (Diabetic or Prediabetic)  Discontinued       Hemoglobin A1C (%)   Date Value   01/24/2023 5.6   10/07/2020 5.7   05/01/2019 5.7             ( goal A1C is < 7)   No components found for: \"LABMICR\"  No components found for: \"LDLCHOLESTEROL\", \"LDLCALC\"    (goal LDL is <100)   AST (U/L)   Date Value   10/22/2024 17     ALT (U/L)   Date Value   10/22/2024 9 (L)     BUN (mg/dL)   Date Value   10/22/2024 9     BP Readings from Last 3 Encounters:   03/06/25 104/68   10/22/24 124/82   04/25/24 122/80          (goal 120/80)    All Future Testing

## 2025-05-22 ASSESSMENT — ENCOUNTER SYMPTOMS
NAUSEA: 0
VOMITING: 0
SWOLLEN GLANDS: 0
SORE THROAT: 0
ANAL BLEEDING: 0
CHANGE IN BOWEL HABIT: 0
CHOKING: 0
COUGH: 0
CONSTIPATION: 0
WHEEZING: 0
SHORTNESS OF BREATH: 0
ABDOMINAL PAIN: 0
VISUAL CHANGE: 0
DIARRHEA: 0
BLOOD IN STOOL: 0
CHEST TIGHTNESS: 0

## 2025-05-22 ASSESSMENT — VISUAL ACUITY: OU: 1

## 2025-05-28 ENCOUNTER — HOSPITAL ENCOUNTER (OUTPATIENT)
Dept: VASCULAR LAB | Age: 65
Discharge: HOME | End: 2025-05-30
Payer: COMMERCIAL

## 2025-05-28 DIAGNOSIS — I65.22 CAROTID ARTERY PLAQUE, LEFT: ICD-10-CM

## 2025-05-28 DIAGNOSIS — F41.1 GAD (GENERALIZED ANXIETY DISORDER): ICD-10-CM

## 2025-05-28 PROCEDURE — 93880 EXTRACRANIAL BILAT STUDY: CPT

## 2025-05-28 RX ORDER — HYDROXYZINE PAMOATE 50 MG/1
CAPSULE ORAL
Qty: 180 CAPSULE | Refills: 0 | Status: SHIPPED | OUTPATIENT
Start: 2025-05-28

## 2025-05-28 NOTE — TELEPHONE ENCOUNTER
Last visit: 05/14/2025  Last Med refill: 02/17/2025  Does patient have enough medication for 72 hours: Yes    Next Visit Date:  Future Appointments   Date Time Provider Department Center   5/28/2025 11:00 AM [facility]ULAR 1 [facility] Radiolog   8/14/2025 11:30 AM Tiffanie Rodriguez MD [facility] DEP   3/9/2026 11:30 AM Tiffanie Rodriguez MD [facility] DEP       Health Maintenance   Topic Date Due    DTaP/Tdap/Td vaccine (1 - Tdap) Never done    Cervical cancer screen  Never done    Shingles vaccine (1 of 2) Never done    DEXA (modify frequency per FRAX score)  Never done    COVID-19 Vaccine (3 - 2024-25 season) 09/01/2024    Flu vaccine (Season Ended) 10/22/2025 (Originally 8/1/2025)    Pneumococcal 50+ years Vaccine (1 of 2 - PCV) 03/06/2026 (Originally [birth year]/24/1979)    Lung Cancer Screening &/or Counseling  03/06/2026 (Originally 3/24/2010)    Lipids  10/22/2025    Breast cancer screen  10/22/2025    Diabetes screen  01/24/2026    Depression Monitoring  03/06/2026    Colorectal Cancer Screen  07/25/2029    Respiratory Syncytial Virus (RSV) Pregnant or age 60 yrs+ (1 - 1-dose 75+ series) 03/24/2035    Annual Wellness Visit (Medicare Advantage)  Completed    Hepatitis C screen  Completed    HIV screen  Completed    Hepatitis A vaccine  Aged Out    Hepatitis B vaccine  Aged Out    Hib vaccine  Aged Out    Polio vaccine  Aged Out    Meningococcal (ACWY) vaccine  Aged Out    Meningococcal B vaccine  Aged Out    A1C test (Diabetic or Prediabetic)  Discontinued       Hemoglobin A1C (%)   Date Value   01/24/2023 5.6   10/07/2020 5.7   05/01/2019 5.7             ( goal A1C is < 7)   No components found for: \"LABMICR\"  No components found for: \"LDLCHOLESTEROL\", \"LDLCALC\"    (goal LDL is <100)   AST (U/L)   Date Value   10/22/2024 17     ALT (U/L)   Date Value   10/22/2024 9 (L)     BUN (mg/dL)   Date Value   10/22/2024 9     BP Readings from Last 3 Encounters:   05/14/25 132/86   03/06/25 104/68

## 2025-05-29 LAB
VAS LEFT BULB EDV: 21.9 CM/S
VAS LEFT BULB PSV: 65.9 CM/S
VAS LEFT CCA DIST EDV: 34.8 CM/S
VAS LEFT CCA DIST PSV: 97 CM/S
VAS LEFT CCA PROX EDV: 16.7 CM/S
VAS LEFT CCA PROX PSV: 80.2 CM/S
VAS LEFT ECA EDV: 10.23 CM/S
VAS LEFT ECA PSV: 56.9 CM/S
VAS LEFT ICA DIST EDV: 28.4 CM/S
VAS LEFT ICA DIST PSV: 84 CM/S
VAS LEFT ICA PROX EDV: 28.4 CM/S
VAS LEFT ICA PROX PSV: 80.2 CM/S
VAS LEFT ICA/CCA PSV: 0.87 NO UNITS
VAS LEFT VERTEBRAL EDV: 5.97 CM/S
VAS LEFT VERTEBRAL PSV: 23 CM/S
VAS RIGHT BULB EDV: 40.3 CM/S
VAS RIGHT BULB PSV: 93.7 CM/S
VAS RIGHT CCA DIST EDV: 27.4 CM/S
VAS RIGHT CCA DIST PSV: 87.2 CM/S
VAS RIGHT CCA PROX EDV: 24.1 CM/S
VAS RIGHT CCA PROX PSV: 69.2 CM/S
VAS RIGHT ECA EDV: 14.4 CM/S
VAS RIGHT ECA PSV: 92.1 CM/S
VAS RIGHT ICA DIST EDV: 22.5 CM/S
VAS RIGHT ICA DIST PSV: 75.9 CM/S
VAS RIGHT ICA PROX EDV: 29 CM/S
VAS RIGHT ICA PROX PSV: 105 CM/S
VAS RIGHT ICA/CCA PSV: 1.2 NO UNITS
VAS RIGHT VERTEBRAL EDV: 13.88 CM/S
VAS RIGHT VERTEBRAL PSV: 34.8 CM/S

## 2025-05-29 PROCEDURE — 93880 EXTRACRANIAL BILAT STUDY: CPT | Performed by: STUDENT IN AN ORGANIZED HEALTH CARE EDUCATION/TRAINING PROGRAM

## 2025-06-28 ENCOUNTER — PATIENT MESSAGE (OUTPATIENT)
Dept: FAMILY MEDICINE CLINIC | Age: 65
End: 2025-06-28

## 2025-06-28 DIAGNOSIS — H54.7 VISUAL IMPAIRMENT OF RIGHT EYE: Primary | ICD-10-CM

## 2025-07-01 PROBLEM — H54.7 VISUAL IMPAIRMENT OF RIGHT EYE: Status: ACTIVE | Noted: 2021-01-01

## 2025-07-05 DIAGNOSIS — I10 ESSENTIAL HYPERTENSION: ICD-10-CM

## 2025-07-05 DIAGNOSIS — J01.90 ACUTE BACTERIAL SINUSITIS: ICD-10-CM

## 2025-07-05 DIAGNOSIS — B96.89 ACUTE BACTERIAL SINUSITIS: ICD-10-CM

## 2025-07-07 RX ORDER — ATENOLOL 25 MG/1
25 TABLET ORAL DAILY
Qty: 90 TABLET | Refills: 1 | Status: SHIPPED | OUTPATIENT
Start: 2025-07-07

## 2025-07-07 RX ORDER — FLUTICASONE PROPIONATE 50 MCG
SPRAY, SUSPENSION (ML) NASAL
Qty: 16 G | Refills: 5 | Status: SHIPPED | OUTPATIENT
Start: 2025-07-07

## 2025-07-07 NOTE — TELEPHONE ENCOUNTER
Last visit: 05/14/25  Last Med refill: 01/21/25  Does patient have enough medication for 72 hours: No:     Next Visit Date:  Future Appointments   Date Time Provider Department Center   3/9/2026 11:30 AM Tiffanie Rodriguez MD [facility] DEP       Health Maintenance   Topic Date Due    DTaP/Tdap/Td vaccine (1 - Tdap) Never done    Cervical cancer screen  Never done    Shingles vaccine (1 of 2) Never done    DEXA (modify frequency per FRAX score)  Never done    COVID-19 Vaccine (3 - 2024-25 season) 09/01/2024    Pneumococcal 50+ years Vaccine (1 of 2 - PCV) 03/06/2026 (Originally [birth year]/24/1979)    Lung Cancer Screening &/or Counseling  03/06/2026 (Originally 3/24/2010)    Flu vaccine (1) 08/01/2025    Lipids  10/22/2025    Breast cancer screen  10/22/2025    Diabetes screen  01/24/2026    Depression Monitoring  03/06/2026    Colorectal Cancer Screen  07/25/2029    Respiratory Syncytial Virus (RSV) Pregnant or age 60 yrs+ (1 - 1-dose 75+ series) 03/24/2035    Annual Wellness Visit (Medicare Advantage)  Completed    Hepatitis C screen  Completed    HIV screen  Completed    Hepatitis A vaccine  Aged Out    Hepatitis B vaccine  Aged Out    Hib vaccine  Aged Out    Polio vaccine  Aged Out    Meningococcal (ACWY) vaccine  Aged Out    Meningococcal B vaccine  Aged Out    A1C test (Diabetic or Prediabetic)  Discontinued       Hemoglobin A1C (%)   Date Value   01/24/2023 5.6   10/07/2020 5.7   05/01/2019 5.7             ( goal A1C is < 7)   No components found for: \"LABMICR\"  No components found for: \"LDLCHOLESTEROL\", \"LDLCALC\"    (goal LDL is <100)   AST (U/L)   Date Value   10/22/2024 17     ALT (U/L)   Date Value   10/22/2024 9 (L)     BUN (mg/dL)   Date Value   10/22/2024 9     BP Readings from Last 3 Encounters:   05/14/25 132/86   03/06/25 104/68   10/22/24 124/82          (goal 120/80)    All Future Testing planned in [facility]  Lab Frequency Next Occurrence   Urine Drug Screen Once 10/11/2024

## 2025-07-16 DIAGNOSIS — R52 CHRONIC GENERALIZED PAIN: ICD-10-CM

## 2025-07-16 DIAGNOSIS — G89.29 CHRONIC GENERALIZED PAIN: ICD-10-CM

## 2025-07-16 NOTE — TELEPHONE ENCOUNTER
Last visit: 05/14/2025  Last Med refill: 06/17/2025  Does patient have enough medication for 72 hours:     Next Visit Date:  Future Appointments   Date Time Provider Department Center   3/9/2026 11:30 AM Tiffanie Rodriguez MD [facility] DEP       Health Maintenance   Topic Date Due    DTaP/Tdap/Td vaccine (1 - Tdap) Never done    Cervical cancer screen  Never done    Shingles vaccine (1 of 2) Never done    DEXA (modify frequency per FRAX score)  Never done    COVID-19 Vaccine (3 - 2024-25 season) 09/01/2024    Pneumococcal 50+ years Vaccine (1 of 2 - PCV) 03/06/2026 (Originally [birth year]/24/1979)    Lung Cancer Screening &/or Counseling  03/06/2026 (Originally 3/24/2010)    Flu vaccine (1) 08/01/2025    Lipids  10/22/2025    Breast cancer screen  10/22/2025    Diabetes screen  01/24/2026    Depression Monitoring  03/06/2026    Colorectal Cancer Screen  07/25/2029    Respiratory Syncytial Virus (RSV) Pregnant or age 60 yrs+ (1 - 1-dose 75+ series) 03/24/2035    Annual Wellness Visit (Medicare Advantage)  Completed    Hepatitis C screen  Completed    HIV screen  Completed    Hepatitis A vaccine  Aged Out    Hepatitis B vaccine  Aged Out    Hib vaccine  Aged Out    Polio vaccine  Aged Out    Meningococcal (ACWY) vaccine  Aged Out    Meningococcal B vaccine  Aged Out    A1C test (Diabetic or Prediabetic)  Discontinued       Hemoglobin A1C (%)   Date Value   01/24/2023 5.6   10/07/2020 5.7   05/01/2019 5.7             ( goal A1C is < 7)   No components found for: \"LABMICR\"  No components found for: \"LDLCHOLESTEROL\", \"LDLCALC\"    (goal LDL is <100)   AST (U/L)   Date Value   10/22/2024 17     ALT (U/L)   Date Value   10/22/2024 9 (L)     BUN (mg/dL)   Date Value   10/22/2024 9     BP Readings from Last 3 Encounters:   05/14/25 132/86   03/06/25 104/68   10/22/24 124/82          (goal 120/80)    All Future Testing planned in [facility]  Lab Frequency Next Occurrence   Urine Drug Screen Once 10/11/2024

## 2025-07-17 RX ORDER — TRAMADOL HYDROCHLORIDE 50 MG/1
50 TABLET ORAL EVERY 8 HOURS PRN
Qty: 30 TABLET | Refills: 1 | Status: SHIPPED | OUTPATIENT
Start: 2025-07-17 | End: 2025-09-15

## 2025-08-25 DIAGNOSIS — F41.1 GAD (GENERALIZED ANXIETY DISORDER): ICD-10-CM

## 2025-08-26 RX ORDER — HYDROXYZINE PAMOATE 50 MG/1
50 CAPSULE ORAL 2 TIMES DAILY
Qty: 180 CAPSULE | Refills: 0 | Status: SHIPPED | OUTPATIENT
Start: 2025-08-26